# Patient Record
Sex: FEMALE | Race: WHITE | Employment: OTHER | URBAN - METROPOLITAN AREA
[De-identification: names, ages, dates, MRNs, and addresses within clinical notes are randomized per-mention and may not be internally consistent; named-entity substitution may affect disease eponyms.]

---

## 2022-09-26 ENCOUNTER — HOSPITAL ENCOUNTER (INPATIENT)
Age: 82
LOS: 1 days | Discharge: HOME | DRG: 066 | End: 2022-09-27
Attending: EMERGENCY MEDICINE | Admitting: HOSPITALIST
Payer: MEDICARE

## 2022-09-26 ENCOUNTER — APPOINTMENT (OUTPATIENT)
Dept: MRI IMAGING | Age: 82
DRG: 066 | End: 2022-09-26
Attending: EMERGENCY MEDICINE
Payer: MEDICARE

## 2022-09-26 ENCOUNTER — APPOINTMENT (OUTPATIENT)
Dept: CT IMAGING | Age: 82
DRG: 066 | End: 2022-09-26
Attending: EMERGENCY MEDICINE
Payer: MEDICARE

## 2022-09-26 ENCOUNTER — APPOINTMENT (OUTPATIENT)
Dept: GENERAL RADIOLOGY | Age: 82
DRG: 066 | End: 2022-09-26
Attending: EMERGENCY MEDICINE
Payer: MEDICARE

## 2022-09-26 DIAGNOSIS — R11.2 NAUSEA AND VOMITING, UNSPECIFIED VOMITING TYPE: ICD-10-CM

## 2022-09-26 DIAGNOSIS — R42 VERTIGO: Primary | ICD-10-CM

## 2022-09-26 DIAGNOSIS — K52.9 COLITIS: ICD-10-CM

## 2022-09-26 LAB
ALBUMIN SERPL-MCNC: 4 G/DL (ref 3.5–5)
ALBUMIN/GLOB SERPL: 1.1 {RATIO} (ref 1.1–2.2)
ALP SERPL-CCNC: 102 U/L (ref 45–117)
ALT SERPL-CCNC: 24 U/L (ref 12–78)
AMORPH CRY URNS QL MICRO: ABNORMAL
ANION GAP SERPL CALC-SCNC: 9 MMOL/L (ref 5–15)
APPEARANCE UR: CLEAR
AST SERPL-CCNC: 37 U/L (ref 15–37)
BACTERIA URNS QL MICRO: NEGATIVE /HPF
BASOPHILS # BLD: 0 K/UL (ref 0–0.1)
BASOPHILS NFR BLD: 0 % (ref 0–1)
BILIRUB SERPL-MCNC: 1.2 MG/DL (ref 0.2–1)
BILIRUB UR QL: NEGATIVE
BUN SERPL-MCNC: 16 MG/DL (ref 6–20)
BUN/CREAT SERPL: 19 (ref 12–20)
CALCIUM SERPL-MCNC: 10 MG/DL (ref 8.5–10.1)
CHLORIDE SERPL-SCNC: 103 MMOL/L (ref 97–108)
CO2 SERPL-SCNC: 24 MMOL/L (ref 21–32)
COLOR UR: ABNORMAL
CREAT SERPL-MCNC: 0.83 MG/DL (ref 0.55–1.02)
DIFFERENTIAL METHOD BLD: ABNORMAL
EOSINOPHIL # BLD: 0 K/UL (ref 0–0.4)
EOSINOPHIL NFR BLD: 0 % (ref 0–7)
EPITH CASTS URNS QL MICRO: ABNORMAL /LPF
ERYTHROCYTE [DISTWIDTH] IN BLOOD BY AUTOMATED COUNT: 12 % (ref 11.5–14.5)
GLOBULIN SER CALC-MCNC: 3.8 G/DL (ref 2–4)
GLUCOSE BLD STRIP.AUTO-MCNC: 135 MG/DL (ref 65–117)
GLUCOSE SERPL-MCNC: 193 MG/DL (ref 65–100)
GLUCOSE UR STRIP.AUTO-MCNC: NEGATIVE MG/DL
HCT VFR BLD AUTO: 36.7 % (ref 35–47)
HGB BLD-MCNC: 12.5 G/DL (ref 11.5–16)
HGB UR QL STRIP: NEGATIVE
IMM GRANULOCYTES # BLD AUTO: 0 K/UL (ref 0–0.04)
IMM GRANULOCYTES NFR BLD AUTO: 0 % (ref 0–0.5)
KETONES UR QL STRIP.AUTO: NEGATIVE MG/DL
LACTATE SERPL-SCNC: 1.2 MMOL/L (ref 0.4–2)
LACTATE SERPL-SCNC: 2.4 MMOL/L (ref 0.4–2)
LEUKOCYTE ESTERASE UR QL STRIP.AUTO: NEGATIVE
LIPASE SERPL-CCNC: 210 U/L (ref 73–393)
LYMPHOCYTES # BLD: 0.9 K/UL (ref 0.8–3.5)
LYMPHOCYTES NFR BLD: 13 % (ref 12–49)
MAGNESIUM SERPL-MCNC: 2.1 MG/DL (ref 1.6–2.4)
MCH RBC QN AUTO: 31.7 PG (ref 26–34)
MCHC RBC AUTO-ENTMCNC: 34.1 G/DL (ref 30–36.5)
MCV RBC AUTO: 93.1 FL (ref 80–99)
MONOCYTES # BLD: 0.3 K/UL (ref 0–1)
MONOCYTES NFR BLD: 4 % (ref 5–13)
NEUTS SEG # BLD: 6 K/UL (ref 1.8–8)
NEUTS SEG NFR BLD: 83 % (ref 32–75)
NITRITE UR QL STRIP.AUTO: NEGATIVE
NRBC # BLD: 0 K/UL (ref 0–0.01)
NRBC BLD-RTO: 0 PER 100 WBC
PH UR STRIP: 7.5 [PH] (ref 5–8)
PLATELET # BLD AUTO: 246 K/UL (ref 150–400)
PMV BLD AUTO: 9.7 FL (ref 8.9–12.9)
POTASSIUM SERPL-SCNC: 3.9 MMOL/L (ref 3.5–5.1)
PROT SERPL-MCNC: 7.8 G/DL (ref 6.4–8.2)
PROT UR STRIP-MCNC: NEGATIVE MG/DL
RBC # BLD AUTO: 3.94 M/UL (ref 3.8–5.2)
RBC #/AREA URNS HPF: ABNORMAL /HPF (ref 0–5)
SERVICE CMNT-IMP: ABNORMAL
SODIUM SERPL-SCNC: 136 MMOL/L (ref 136–145)
SP GR UR REFRACTOMETRY: 1.01 (ref 1–1.03)
TROPONIN-HIGH SENSITIVITY: 5 NG/L (ref 0–51)
UA: UC IF INDICATED,UAUC: ABNORMAL
UROBILINOGEN UR QL STRIP.AUTO: 0.2 EU/DL (ref 0.2–1)
WBC # BLD AUTO: 7.2 K/UL (ref 3.6–11)
WBC URNS QL MICRO: ABNORMAL /HPF (ref 0–4)

## 2022-09-26 PROCEDURE — 83690 ASSAY OF LIPASE: CPT

## 2022-09-26 PROCEDURE — 70553 MRI BRAIN STEM W/O & W/DYE: CPT

## 2022-09-26 PROCEDURE — 84484 ASSAY OF TROPONIN QUANT: CPT

## 2022-09-26 PROCEDURE — 70496 CT ANGIOGRAPHY HEAD: CPT

## 2022-09-26 PROCEDURE — 87040 BLOOD CULTURE FOR BACTERIA: CPT

## 2022-09-26 PROCEDURE — A9576 INJ PROHANCE MULTIPACK: HCPCS | Performed by: HOSPITALIST

## 2022-09-26 PROCEDURE — 74011000636 HC RX REV CODE- 636: Performed by: EMERGENCY MEDICINE

## 2022-09-26 PROCEDURE — 74011250637 HC RX REV CODE- 250/637: Performed by: EMERGENCY MEDICINE

## 2022-09-26 PROCEDURE — 74011000250 HC RX REV CODE- 250: Performed by: EMERGENCY MEDICINE

## 2022-09-26 PROCEDURE — 85025 COMPLETE CBC W/AUTO DIFF WBC: CPT

## 2022-09-26 PROCEDURE — 83735 ASSAY OF MAGNESIUM: CPT

## 2022-09-26 PROCEDURE — 99285 EMERGENCY DEPT VISIT HI MDM: CPT

## 2022-09-26 PROCEDURE — 80053 COMPREHEN METABOLIC PANEL: CPT

## 2022-09-26 PROCEDURE — 36415 COLL VENOUS BLD VENIPUNCTURE: CPT

## 2022-09-26 PROCEDURE — 83605 ASSAY OF LACTIC ACID: CPT

## 2022-09-26 PROCEDURE — 81001 URINALYSIS AUTO W/SCOPE: CPT

## 2022-09-26 PROCEDURE — 74011000258 HC RX REV CODE- 258: Performed by: HOSPITALIST

## 2022-09-26 PROCEDURE — 74177 CT ABD & PELVIS W/CONTRAST: CPT

## 2022-09-26 PROCEDURE — 96374 THER/PROPH/DIAG INJ IV PUSH: CPT

## 2022-09-26 PROCEDURE — 70450 CT HEAD/BRAIN W/O DYE: CPT

## 2022-09-26 PROCEDURE — 71045 X-RAY EXAM CHEST 1 VIEW: CPT

## 2022-09-26 PROCEDURE — 65270000046 HC RM TELEMETRY

## 2022-09-26 PROCEDURE — 74011250636 HC RX REV CODE- 250/636: Performed by: EMERGENCY MEDICINE

## 2022-09-26 PROCEDURE — 74011250636 HC RX REV CODE- 250/636: Performed by: HOSPITALIST

## 2022-09-26 PROCEDURE — 93005 ELECTROCARDIOGRAM TRACING: CPT

## 2022-09-26 PROCEDURE — 74011250637 HC RX REV CODE- 250/637: Performed by: HOSPITALIST

## 2022-09-26 PROCEDURE — 82962 GLUCOSE BLOOD TEST: CPT

## 2022-09-26 PROCEDURE — 96375 TX/PRO/DX INJ NEW DRUG ADDON: CPT

## 2022-09-26 RX ORDER — ACETAMINOPHEN 325 MG/1
650 TABLET ORAL
Status: DISCONTINUED | OUTPATIENT
Start: 2022-09-26 | End: 2022-09-27 | Stop reason: HOSPADM

## 2022-09-26 RX ORDER — ASPIRIN 325 MG
325 TABLET ORAL ONCE
Status: COMPLETED | OUTPATIENT
Start: 2022-09-26 | End: 2022-09-26

## 2022-09-26 RX ORDER — ADHESIVE BANDAGE
30 BANDAGE TOPICAL DAILY PRN
Status: DISCONTINUED | OUTPATIENT
Start: 2022-09-26 | End: 2022-09-27 | Stop reason: HOSPADM

## 2022-09-26 RX ORDER — SODIUM CHLORIDE 0.9 % (FLUSH) 0.9 %
5-40 SYRINGE (ML) INJECTION EVERY 8 HOURS
Status: DISCONTINUED | OUTPATIENT
Start: 2022-09-26 | End: 2022-09-27 | Stop reason: HOSPADM

## 2022-09-26 RX ORDER — ATORVASTATIN CALCIUM 40 MG/1
40 TABLET, FILM COATED ORAL
Status: DISCONTINUED | OUTPATIENT
Start: 2022-09-26 | End: 2022-09-27 | Stop reason: HOSPADM

## 2022-09-26 RX ORDER — DROPERIDOL 2.5 MG/ML
0.62 INJECTION, SOLUTION INTRAMUSCULAR; INTRAVENOUS ONCE
Status: COMPLETED | OUTPATIENT
Start: 2022-09-26 | End: 2022-09-26

## 2022-09-26 RX ORDER — FAMOTIDINE 20 MG/1
20 TABLET, FILM COATED ORAL 2 TIMES DAILY
Status: DISCONTINUED | OUTPATIENT
Start: 2022-09-26 | End: 2022-09-27 | Stop reason: HOSPADM

## 2022-09-26 RX ORDER — ENOXAPARIN SODIUM 100 MG/ML
30 INJECTION SUBCUTANEOUS EVERY 24 HOURS
Status: DISCONTINUED | OUTPATIENT
Start: 2022-09-26 | End: 2022-09-27 | Stop reason: HOSPADM

## 2022-09-26 RX ORDER — ONDANSETRON 2 MG/ML
4 INJECTION INTRAMUSCULAR; INTRAVENOUS
Status: DISCONTINUED | OUTPATIENT
Start: 2022-09-26 | End: 2022-09-27 | Stop reason: HOSPADM

## 2022-09-26 RX ORDER — GUAIFENESIN 100 MG/5ML
81 LIQUID (ML) ORAL DAILY
Status: DISCONTINUED | OUTPATIENT
Start: 2022-09-27 | End: 2022-09-27 | Stop reason: HOSPADM

## 2022-09-26 RX ORDER — ONDANSETRON 2 MG/ML
4 INJECTION INTRAMUSCULAR; INTRAVENOUS
Status: COMPLETED | OUTPATIENT
Start: 2022-09-26 | End: 2022-09-26

## 2022-09-26 RX ORDER — MECLIZINE HCL 12.5 MG 12.5 MG/1
25 TABLET ORAL
Status: COMPLETED | OUTPATIENT
Start: 2022-09-26 | End: 2022-09-26

## 2022-09-26 RX ORDER — METOCLOPRAMIDE HYDROCHLORIDE 5 MG/ML
10 INJECTION INTRAMUSCULAR; INTRAVENOUS
Status: COMPLETED | OUTPATIENT
Start: 2022-09-26 | End: 2022-09-26

## 2022-09-26 RX ORDER — SODIUM CHLORIDE 0.9 % (FLUSH) 0.9 %
5-40 SYRINGE (ML) INJECTION AS NEEDED
Status: DISCONTINUED | OUTPATIENT
Start: 2022-09-26 | End: 2022-09-27 | Stop reason: HOSPADM

## 2022-09-26 RX ORDER — SODIUM CHLORIDE 9 MG/ML
100 INJECTION, SOLUTION INTRAVENOUS CONTINUOUS
Status: DISCONTINUED | OUTPATIENT
Start: 2022-09-26 | End: 2022-09-26 | Stop reason: SDUPTHER

## 2022-09-26 RX ORDER — ACETAMINOPHEN 650 MG/1
650 SUPPOSITORY RECTAL
Status: DISCONTINUED | OUTPATIENT
Start: 2022-09-26 | End: 2022-09-27 | Stop reason: HOSPADM

## 2022-09-26 RX ORDER — SODIUM CHLORIDE 9 MG/ML
75 INJECTION, SOLUTION INTRAVENOUS CONTINUOUS
Status: DISPENSED | OUTPATIENT
Start: 2022-09-26 | End: 2022-09-27

## 2022-09-26 RX ADMIN — IOPAMIDOL 100 ML: 755 INJECTION, SOLUTION INTRAVENOUS at 12:54

## 2022-09-26 RX ADMIN — ATORVASTATIN CALCIUM 40 MG: 40 TABLET, FILM COATED ORAL at 22:16

## 2022-09-26 RX ADMIN — SODIUM CHLORIDE, PRESERVATIVE FREE 10 ML: 5 INJECTION INTRAVENOUS at 18:09

## 2022-09-26 RX ADMIN — AMPICILLIN SODIUM AND SULBACTAM SODIUM 3 G: 2; 1 INJECTION, POWDER, FOR SOLUTION INTRAMUSCULAR; INTRAVENOUS at 23:15

## 2022-09-26 RX ADMIN — AMPICILLIN SODIUM AND SULBACTAM SODIUM 3 G: 2; 1 INJECTION, POWDER, FOR SOLUTION INTRAMUSCULAR; INTRAVENOUS at 18:38

## 2022-09-26 RX ADMIN — ONDANSETRON 4 MG: 2 INJECTION INTRAMUSCULAR; INTRAVENOUS at 11:18

## 2022-09-26 RX ADMIN — ONDANSETRON 4 MG: 2 INJECTION INTRAMUSCULAR; INTRAVENOUS at 21:31

## 2022-09-26 RX ADMIN — SODIUM CHLORIDE 1000 ML: 9 INJECTION, SOLUTION INTRAVENOUS at 12:20

## 2022-09-26 RX ADMIN — METOCLOPRAMIDE 10 MG: 5 INJECTION, SOLUTION INTRAMUSCULAR; INTRAVENOUS at 12:19

## 2022-09-26 RX ADMIN — DROPERIDOL 0.62 MG: 2.5 INJECTION, SOLUTION INTRAMUSCULAR; INTRAVENOUS at 16:09

## 2022-09-26 RX ADMIN — GADOTERIDOL 10 ML: 279.3 INJECTION, SOLUTION INTRAVENOUS at 21:58

## 2022-09-26 RX ADMIN — ASPIRIN 325 MG ORAL TABLET 325 MG: 325 PILL ORAL at 16:08

## 2022-09-26 RX ADMIN — SODIUM CHLORIDE 75 ML/HR: 9 INJECTION, SOLUTION INTRAVENOUS at 18:03

## 2022-09-26 RX ADMIN — MECLIZINE 25 MG: 12.5 TABLET ORAL at 16:08

## 2022-09-26 RX ADMIN — ENOXAPARIN SODIUM 30 MG: 100 INJECTION SUBCUTANEOUS at 18:00

## 2022-09-26 RX ADMIN — SODIUM CHLORIDE, PRESERVATIVE FREE 10 ML: 5 INJECTION INTRAVENOUS at 21:31

## 2022-09-26 RX ADMIN — FAMOTIDINE 20 MG: 20 TABLET, FILM COATED ORAL at 18:01

## 2022-09-26 NOTE — H&P
Admission History and Physical      NAME:  Tori Lee   :   1940   MRN:  [id number]     PCP:  None     Date/Time:  2022           Subjective:     CHIEF COMPLAINT: Dizziness    HISTORY OF PRESENT ILLNESS:     Ms. Ciaran Aguillon is a 78 y.o. female with no medical major past medical problems, has not seen her PCP in 10 years. Actively healthy living. Woke up this morning having some dizzy spells vomiting and ataxia. She was brought in as code stroke to ED, CT head and CT angio head and neck was done which showed no acute stroke but chronic changes. It was felt by ER physician and MD that she would need to be further work-up with MRI brain and neurology evaluation. Patient was given meclizine with improvement in her symptoms. Patient otherwise denies any acute complaints to me no nausea, no abdominal pain, no diarrhea. Overall she is not excited about staying in the hospital and has been asking if she could be discharged home soon. On insistence she agreed to stay till work-up done for another 24-hour. No past medical history on file. Denies previous major medical problems    No past surgical history on file. Social History     Tobacco Use    Smoking status: Not on file    Smokeless tobacco: Not on file   Substance Use Topics    Alcohol use: Not on file        No family history on file.   Family history of stroke    No Known Allergies     Prior to Admission medications    Not on File     Does not take any home medications    Review of Systems:  Pertinent findings per HPI otherwise remaining 10 review of system negative         Objective:      VITALS:    Vital signs reviewed; most recent are:    Visit Vitals  BP (!) 168/72   Pulse (!) 59   Temp (!) 94.2 °F (34.6 °C)   Resp 22   Ht 5' 4\" (1.626 m)   Wt 49.9 kg (110 lb)   SpO2 99%   BMI 18.88 kg/m²     SpO2 Readings from Last 6 Encounters:   22 99%        No intake or output data in the 24 hours ending 22 0605         Exam: Physical Exam:    Gen: Thin built in no acute distress  HEENT:  hearing intact to voice, moist mucous membranes  Neck:  Supple, without masses, thyroid non-tender  Resp:  No accessory muscle use, clear breath sounds without wheezes rales or rhonchi  Card:  No murmurs, normal S1, S2 without thrills, bruits or peripheral edema  Abd:  Soft, non-tender, non-distended, nontender  Lymph:  No visible cervical adenopathy  Musc:  No cyanosis or clubbing  Skin:  No rashes   Neuro:    follows commands appropriately, no focal deficits, gait not tested  Psych:   Anxious       Labs:    Recent Labs     09/26/22  1120   WBC 7.2   HGB 12.5   HCT 36.7        Recent Labs     09/26/22  1120      K 3.9      CO2 24   *   BUN 16   CREA 0.83   CA 10.0   MG 2.1   ALB 4.0   TBILI 1.2*   ALT 24     No results found for: GLUCPOC  No results for input(s): PH, PCO2, PO2, HCO3, FIO2 in the last 72 hours. No results for input(s): INR, INREXT in the last 72 hours. CTA head/neck- IMPRESSION     CTA Head:  1. No evidence of significant stenosis or aneurysm. 2. Redemonstrated irregular serpiginous calcifications in the left anterior  inferior cerebellum, which are chronic, and can be evaluated with outpatient MRI  brain if clinically warranted. CTA Neck:  1. No evidence of flow-limiting stenosis. 2. Mild stenosis (less than 50% by NASCET criteria) of the proximal left  internal carotid artery. 3. Beaded appearance of the bilateral cervical internal carotid arteries,  suggestive of fibromuscular dysplasia. Assesment and Plan:     Suspected TIA versus stroke  Had some dizzy spells/gait instability and some vomiting.   CT head no acute process but CTA head and neck as above showed some chronic changes  Stroke order set initiated  MRI brain ordered  Get echocardiogram  Bedside swallowing if passes will put her on diet and meds  PT OT and SLP evaluation  Aspirin and statins for now  Check LDL    Elevated BP W/O HTN-allow permissive HTN until stroke ruled out  Hyperglycemia-check A1c, monitor BG  Nonspecific colitis-noted on CT, mild elevated LA, empiric IV Unasyn, GI consult    AD-she does not have one  DVT PRx SQ Lovenox  NOK-son  Dispo-Home if work-up complete 24 to 48 hours             ___________________________________________________    Attending Physician: Lucero Corona MD   9/26/2022

## 2022-09-26 NOTE — PROGRESS NOTES
Physical Therapy    Chart reviewed and orders acknowledge. Attempted to see pt for PT eval this PM but pt currently ABAD and unavailable. Will follow up tomorrow.     Marco Romero PT, DPT, NCS

## 2022-09-26 NOTE — ED PROVIDER NOTES
EMERGENCY DEPARTMENT HISTORY AND PHYSICAL EXAM      Date: 9/26/2022  Patient Name: [facility]    History of Presenting Illness     CC: n/v      History Provided By: Patient and Daughter-in-law    HPI: [facility], 78 y.o. female presents to the ED with cc of n/v. Pt not able to provide any significant hx except to say she is very nauseated and dizzy. Per family pt had sudden onset this morning of nausea and vomiting. Pt had eaten quiche for dinner last night and had eaten a slice this am after it had set out on counter all night. Almost immediately following this she began vomiting. Pt symptoms severe in nature as she presents with continued dry heaves. Pt states she is also extremely dizzy but unable to elaborate woozy or room spinning or onset of dizziness. She denies any recent illness. She denies headache, CP, SOA, palpitations. She states she is very thirst. She states mild diffuse abdominal soreness. No  hematemesis. No diarrhea. There are no other complaints, changes, or physical findings at this time. PCP: None    No current facility-administered medications on file prior to encounter. No current outpatient medications on file prior to encounter. Past History     Past Medical History:  None    Past Surgical History:  None    Family History:  None    Social History:   No tobacco, alcohol, drugs,     Daughter-in-law states it has been numerous yrs since the pt has seen a doctor but lives at home alone, takes daily vitamin and typically walks 7 miles a day. Allergies:  No Known Allergies      Review of Systems   Review of Systems   Unable to perform ROS: Acuity of condition     Physical Exam   Physical Exam  Vitals and nursing note reviewed. Constitutional:       General: She is in acute distress. Appearance: She is well-developed. She is ill-appearing. She is not diaphoretic. Comments:  Thin elderly female, actively dry heaving   HENT:      Head: Normocephalic and atraumatic. Mouth/Throat:      Mouth: Mucous membranes are dry. Pharynx: No oropharyngeal exudate. Eyes:      Extraocular Movements: Extraocular movements intact. Conjunctiva/sclera: Conjunctivae normal.      Pupils: Pupils are equal, round, and reactive to light. Neck:      Vascular: No JVD. Trachea: No tracheal deviation. Cardiovascular:      Rate and Rhythm: Normal rate and regular rhythm. Heart sounds: Normal heart sounds. No murmur heard. Pulmonary:      Effort: Pulmonary effort is normal. No respiratory distress. Breath sounds: Normal breath sounds. No stridor. No wheezing or rales. Abdominal:      General: There is no distension. Palpations: Abdomen is soft. Tenderness: There is abdominal tenderness (mild diffuse). There is no guarding or rebound. Musculoskeletal:         General: Normal range of motion. Cervical back: Normal range of motion and neck supple. Right lower leg: No edema. Left lower leg: No edema. Skin:     General: Skin is warm and dry. Capillary Refill: Capillary refill takes less than 2 seconds. Neurological:      Mental Status: She is alert and oriented to person, place, and time. Cranial Nerves: No cranial nerve deficit.       Comments: No focal motor or sensory deficits, no facial asymmetry, no speech issues, no nystagmus    Psychiatric:         Mood and Affect: Mood normal.         Behavior: Behavior normal.       Diagnostic Study Results     Labs -     Recent Results (from the past 12 hour(s))   CBC WITH AUTOMATED DIFF    Collection Time: 09/26/22 11:20 AM   Result Value Ref Range    WBC 7.2 3.6 - 11.0 K/uL    RBC 3.94 3.80 - 5.20 M/uL    HGB 12.5 11.5 - 16.0 g/dL    HCT 36.7 35.0 - 47.0 %    MCV 93.1 80.0 - 99.0 FL    MCH 31.7 26.0 - 34.0 PG    MCHC 34.1 30.0 - 36.5 g/dL    RDW 12.0 11.5 - 14.5 %    PLATELET 377 837 - 334 K/uL    MPV 9.7 8.9 - 12.9 FL    NRBC 0.0 0  WBC    ABSOLUTE NRBC 0.00 0.00 - 0.01 K/uL    NEUTROPHILS 83 (H) 32 - 75 %    LYMPHOCYTES 13 12 - 49 %    MONOCYTES 4 (L) 5 - 13 %    EOSINOPHILS 0 0 - 7 %    BASOPHILS 0 0 - 1 %    IMMATURE GRANULOCYTES 0 0.0 - 0.5 %    ABS. NEUTROPHILS 6.0 1.8 - 8.0 K/UL    ABS. LYMPHOCYTES 0.9 0.8 - 3.5 K/UL    ABS. MONOCYTES 0.3 0.0 - 1.0 K/UL    ABS. EOSINOPHILS 0.0 0.0 - 0.4 K/UL    ABS. BASOPHILS 0.0 0.0 - 0.1 K/UL    ABS. IMM. GRANS. 0.0 0.00 - 0.04 K/UL    DF AUTOMATED     LIPASE    Collection Time: 09/26/22 11:20 AM   Result Value Ref Range    Lipase 210 73 - 393 U/L   LACTIC ACID    Collection Time: 09/26/22 11:20 AM   Result Value Ref Range    Lactic acid 2.4 (HH) 0.4 - 2.0 MMOL/L   METABOLIC PANEL, COMPREHENSIVE    Collection Time: 09/26/22 11:20 AM   Result Value Ref Range    Sodium 136 136 - 145 mmol/L    Potassium 3.9 3.5 - 5.1 mmol/L    Chloride 103 97 - 108 mmol/L    CO2 24 21 - 32 mmol/L    Anion gap 9 5 - 15 mmol/L    Glucose 193 (H) 65 - 100 mg/dL    BUN 16 6 - 20 MG/DL    Creatinine 0.83 0.55 - 1.02 MG/DL    BUN/Creatinine ratio 19 12 - 20      GFR est AA >60 >60 ml/min/1.73m2    GFR est non-AA >60 >60 ml/min/1.73m2    Calcium 10.0 8.5 - 10.1 MG/DL    Bilirubin, total 1.2 (H) 0.2 - 1.0 MG/DL    ALT (SGPT) 24 12 - 78 U/L    AST (SGOT) 37 15 - 37 U/L    Alk.  phosphatase 102 45 - 117 U/L    Protein, total 7.8 6.4 - 8.2 g/dL    Albumin 4.0 3.5 - 5.0 g/dL    Globulin 3.8 2.0 - 4.0 g/dL    A-G Ratio 1.1 1.1 - 2.2     TROPONIN-HIGH SENSITIVITY    Collection Time: 09/26/22 11:20 AM   Result Value Ref Range    Troponin-High Sensitivity 5 0 - 51 ng/L   URINALYSIS W/ REFLEX CULTURE    Collection Time: 09/26/22 11:20 AM    Specimen: Urine   Result Value Ref Range    Color YELLOW/STRAW      Appearance CLEAR CLEAR      Specific gravity 1.015 1.003 - 1.030      pH (UA) 7.5 5.0 - 8.0      Protein Negative NEG mg/dL    Glucose Negative NEG mg/dL    Ketone Negative NEG mg/dL    Bilirubin Negative NEG      Blood Negative NEG      Urobilinogen 0.2 0.2 - 1.0 EU/dL Nitrites Negative NEG      Leukocyte Esterase Negative NEG      WBC 0-4 0 - 4 /hpf    RBC 0-5 0 - 5 /hpf    Epithelial cells FEW FEW /lpf    Bacteria Negative NEG /hpf    UA:UC IF INDICATED CULTURE NOT INDICATED BY UA RESULT CNI      Amorphous Crystals FEW (A) NEG     MAGNESIUM    Collection Time: 09/26/22 11:20 AM   Result Value Ref Range    Magnesium 2.1 1.6 - 2.4 mg/dL   EKG, 12 LEAD, INITIAL    Collection Time: 09/26/22 11:45 AM   Result Value Ref Range    Ventricular Rate 53 BPM    Atrial Rate 53 BPM    P-R Interval 160 ms    QRS Duration 84 ms    Q-T Interval 490 ms    QTC Calculation (Bezet) 459 ms    Calculated P Axis 73 degrees    Calculated R Axis 59 degrees    Calculated T Axis 44 degrees    Diagnosis       Sinus bradycardia  Minimal voltage criteria for LVH, may be normal variant  Septal infarct , age undetermined  No previous ECGs available         Radiologic Studies -   XR CHEST PORT   Final Result   No acute findings. CT HEAD WO CONT   Final Result   1. No evidence of acute intracranial abnormality. 2. Moderate to severe chronic microvascular ischemic disease. 3. Irregular serpiginous calcifications in the left anterior inferior   cerebellum, of uncertain etiology. These can be further evaluated with MRI brain   without and with contrast.         CTA HEAD NECK W CONT   Final Result      CTA Head:   1. No evidence of significant stenosis or aneurysm. 2. Redemonstrated irregular serpiginous calcifications in the left anterior   inferior cerebellum, which are chronic, and can be evaluated with outpatient MRI   brain if clinically warranted. CTA Neck:   1. No evidence of flow-limiting stenosis. 2. Mild stenosis (less than 50% by NASCET criteria) of the proximal left   internal carotid artery. 3. Beaded appearance of the bilateral cervical internal carotid arteries,   suggestive of fibromuscular dysplasia. CT ABD PELV W CONT   Final Result   1.   Question low-grade colitis involving the ascending and transverse colon. 2.  Large amount stool in the rectum. 3.  Distended urinary bladder. 4.  Other incidental findings as above      MRI BRAIN W WO CONT    (Results Pending)     CT Results  (Last 48 hours)                 09/26/22 1248  CT HEAD WO CONT Final result    Impression:  1. No evidence of acute intracranial abnormality. 2. Moderate to severe chronic microvascular ischemic disease. 3. Irregular serpiginous calcifications in the left anterior inferior   cerebellum, of uncertain etiology. These can be further evaluated with MRI brain   without and with contrast.           Narrative:  EXAM:  CT HEAD WO CONT       INDICATION:   Dizziness       COMPARISON: None. TECHNIQUE: Unenhanced CT of the head was performed using 5 mm images. Brain and   bone windows were generated. CT dose reduction was achieved through use of a   standardized protocol tailored for this examination and automatic exposure   control for dose modulation. FINDINGS:   The ventricles are normal in size and position. Scattered confluent areas of   periventricular and deep white matter hypodensities, consistent with moderate to   severe chronic microangiopathic ischemic changes. There are irregular   serpiginous calcifications in the left anterior cerebellum. Basilar cisterns are   patent. No midline shift. There is no evidence of acute infarct, hemorrhage, or   extraaxial fluid collection. The paranasal sinuses, mastoid air cells, and middle ears are clear. The orbital   contents are within normal limits. There are no significant osseous or   extracranial soft tissue lesions. 09/26/22 1248  CTA HEAD NECK W CONT Final result    Impression:      CTA Head:   1. No evidence of significant stenosis or aneurysm.    2. Redemonstrated irregular serpiginous calcifications in the left anterior   inferior cerebellum, which are chronic, and can be evaluated with outpatient MRI   brain if clinically warranted. CTA Neck:   1. No evidence of flow-limiting stenosis. 2. Mild stenosis (less than 50% by NASCET criteria) of the proximal left   internal carotid artery. 3. Beaded appearance of the bilateral cervical internal carotid arteries,   suggestive of fibromuscular dysplasia. Narrative:  EXAM:  CTA HEAD NECK W CONT       INDICATION:   severe dizziness       COMPARISON:  CT head 9/26/2022. CONTRAST:  100 mL of Isovue-370. TECHNIQUE:  Unenhanced  images were obtained to localize the volume for   acquisition. Multislice helical axial CT angiography was performed from the   aortic arch to the top of the head during uneventful rapid bolus intravenous   contrast administration. Coronal and sagittal reformations and 3D post   processing was performed. CT dose reduction was achieved through use of a   standardized protocol tailored for this examination and automatic exposure   control for dose modulation. FINDINGS:       CTA Head:   There is no evidence of large vessel occlusion or flow-limiting stenosis of the   intracranial internal carotid, anterior cerebral, and middle cerebral arteries. Calcification of the bilateral carotid siphons without significant stenosis. The   anterior communicating artery is patent. There is no evidence of large vessel occlusion or flow-limiting stenosis of the   intracranial vertebral arteries, basilar artery, or posterior cerebral arteries. The posterior communicating arteries are not well seen. There is no evidence of aneurysm or vascular malformation. The dural venous   sinuses and deep cerebral venous system are patent. No evidence of abnormal   enhancement on delayed phase images. Redemonstrated irregular serpiginous   calcifications in the left anterior inferior cerebellum. CTA NECK:   NASCET method was utilized for calculating stenosis. The aortic arch is unremarkable.  The common carotid arteries demonstrate no   significant stenosis. There is no evidence of significant stenosis in the   cervical right internal carotid artery. Calcific atherosclerosis of the proximal   left internal carotid artery with mild (less than 50%) stenosis. Beaded   appearance of the bilateral cervical internal carotid arteries. There is a left dominant vertebrobasilar arterial system. The cervical vertebral   arteries are normal in course, size and contour without significant stenosis. Visualized soft tissues of the neck are unremarkable. Mild biapical pleural   parenchymal scarring. No acute fracture or aggressive osseous lesion. Severe   degenerative disc disease throughout the cervical spine with scattered facet   arthropathy. 09/26/22 1248  CT ABD PELV W CONT Final result    Impression:  1. Question low-grade colitis involving the ascending and transverse colon. 2.  Large amount stool in the rectum. 3.  Distended urinary bladder. 4.  Other incidental findings as above       Narrative:  EXAM: CT ABD PELV W CONT       INDICATION: Severe nausea and vomiting       COMPARISON: No comparison        CONTRAST: 100 mL of Isovue-370. TECHNIQUE:    Following the uneventful intravenous administration of contrast, thin axial   images were obtained through the abdomen and pelvis. Coronal and sagittal   reconstructions were generated. Oral contrast was not administered. CT dose   reduction was achieved through use of a standardized protocol tailored for this   examination and automatic exposure control for dose modulation. FINDINGS:    LOWER THORAX: No significant abnormality in the incidentally imaged lower chest.   LIVER: No mass. BILIARY TREE: Gallbladder is within normal limits. CBD is not dilated. SPLEEN: within normal limits. PANCREAS: No mass or ductal dilatation. ADRENALS: Unremarkable. KIDNEYS: No mass, calculus, or hydronephrosis.  Small hypodensity left kidney too   small to characterize. STOMACH: Unremarkable. SMALL BOWEL: No dilatation or wall thickening. COLON: Questionable mild wall thickening without dilatation involving the   ascending and transverse colon. . Large amount stool in the rectum. APPENDIX: The appendix is not well visualized. There is possible small   hypodensities versus appendiceal stump with an appendicolith versus a clip. PERITONEUM: No ascites or pneumoperitoneum. RETROPERITONEUM: No lymphadenopathy or aortic aneurysm. REPRODUCTIVE ORGANS: Unremarkable   URINARY BLADDER: Distended urinary bladder. BONES: No destructive bone lesion. 3 mm anterolisthesis of L3 and L4. Degenerative changes with canal stenosis foraminal narrowing the lower lumbar   spine. ABDOMINAL WALL: No mass or hernia. ADDITIONAL COMMENTS: N/A                 CXR Results  (Last 48 hours)                 09/26/22 1503  XR CHEST PORT Final result    Impression:  No acute findings. Narrative:  EXAM: XR CHEST PORT       DATE: 9/26/2022 3:03 PM       INDICATION: Admission       COMPARISON: None. TECHNIQUE: A portable AP radiograph of the chest was obtained. FINDINGS:    Top normal heart size. Mediastinal contours are otherwise unremarkable. No   pulmonary edema. No focal consolidations, pleural effusions, or pneumothorax. Lungs are hyperexpanded bilaterally. No acute osseous findings. Medical Decision Making   I am the first provider for this patient. I reviewed the vital signs, available nursing notes, past medical history, past surgical history, family history and social history. Vital Signs-Reviewed the patient's vital signs.   Patient Vitals for the past 12 hrs:   Temp Pulse Resp BP SpO2   09/26/22 1156 -- (!) 59 22 (!) 168/72 99 %   09/26/22 1141 -- 62 17 (!) 161/64 97 %   09/26/22 1126 (!) 94.2 °F (34.6 °C) (!) 54 22 (!) 165/55 100 %       EKG interpretation: (Preliminary)  Sinus solo, rate 53, normal axis/pr/qrs. Records Reviewed: Nursing Notes    Provider Notes (Medical Decision Making):   DDX- Food poisoning, dehydration, electrolyte abnormality, vertigo, ICH, CVA, colitis, pancreatitis, UTI    ED Course:   Initial assessment performed. The patients presenting problems have been discussed, and they are in agreement with the care plan formulated and outlined with them. I have encouraged them to ask questions as they arise throughout their visit. Pt arrived in distress unable to give an account if vomiting started before dizziness or after. She had been given two  doses of nausea meds with continued dry heaves. With episodes of vomiting pt with vagal bradycardia as well as multi-focal PVC's. Pt also with continued dizziness. Will dose with Inapsine. CT head and CTA head and neck ordered, will also include CT Abd/Pelvis given no etiology for vomiting. Pt denies any abd pain, states she is sore from all of the retching    Pt with resolution of n/v following Inapsine. Pt had ambulated to bathroom with ataxic gait. CT head calcification Cerebellum, no mention of hemorrhage. Recommend MRI w/ and w/o which has been placed. Will dose with ASA 325mg,     CT Abd/pelvis shows transverse colitis, will send BCx, Zosyn ordered, pt had give 1L IV fluid bolus, will order additional fluids. Results discussed with pt and family and plan for admission. After vomiting was brought under control, Ventricular ectopy had ceased. Discussed with pt and family, will admit for further evaluation. Consult:  Case discussed with hospitalist who will evaluate and admit.      CRITICAL CARE NOTE :    5:29 PM    IMPENDING DETERIORATION -Cardiovascular, CNS, Metabolic, and Renal  ASSOCIATED RISK FACTORS - Dysrhythmia, Metabolic changes, Dehydration, and CNS Decompensation  MANAGEMENT- Bedside Assessment and Supervision of Care  INTERPRETATION -  Xrays, CT Scan, ECG, Blood Pressure, Cardiac Output Measures , and labs  INTERVENTIONS - hemodynamic mngmt, Neurologic interventions , and antiemtics  CASE REVIEW - Hospitalist/Intensivist, Nursing, and Family  TREATMENT RESPONSE -Improved  PERFORMED BY - Self    NOTES   :  I have spent 40 minutes of critical care time involved in lab review, consultations with specialist, family decision- making, bedside attention and documentation. This time excludes time spent in any separate billed procedures. During this entire length of time I was immediately available to the patient . Marry Aviles DO         Disposition:  Admit     Diagnosis     Clinical Impression:   1. Vertigo    2. Nausea and vomiting, unspecified vomiting type    3. Colitis        Attestations:    Marry Aviles DO        Please note that this dictation was completed with [employer], the computer voice recognition software. Quite often unanticipated grammatical, syntax, homophones, and other interpretive errors are inadvertently transcribed by the computer software. Please disregard these errors. Please excuse any errors that have escaped final proofreading. Thank you.

## 2022-09-27 ENCOUNTER — APPOINTMENT (OUTPATIENT)
Dept: NON INVASIVE DIAGNOSTICS | Age: 82
DRG: 066 | End: 2022-09-27
Attending: HOSPITALIST
Payer: MEDICARE

## 2022-09-27 ENCOUNTER — APPOINTMENT (OUTPATIENT)
Dept: NON INVASIVE DIAGNOSTICS | Age: 82
DRG: 066 | End: 2022-09-27
Attending: INTERNAL MEDICINE
Payer: MEDICARE

## 2022-09-27 VITALS
HEIGHT: 64 IN | SYSTOLIC BLOOD PRESSURE: 156 MMHG | RESPIRATION RATE: 18 BRPM | OXYGEN SATURATION: 98 % | HEART RATE: 72 BPM | WEIGHT: 110 LBS | BODY MASS INDEX: 18.78 KG/M2 | TEMPERATURE: 98.1 F | DIASTOLIC BLOOD PRESSURE: 74 MMHG

## 2022-09-27 PROBLEM — R42 DIZZINESS: Status: RESOLVED | Noted: 2022-09-26 | Resolved: 2022-09-27

## 2022-09-27 LAB
CHOLEST SERPL-MCNC: 199 MG/DL
CRP SERPL-MCNC: <0.29 MG/DL (ref 0–0.6)
ECHO AO ROOT DIAM: 3.5 CM
ECHO AO ROOT INDEX: 2.3 CM/M2
ECHO AR MAX VEL PISA: 4.8 M/S
ECHO AV AREA PEAK VELOCITY: 2.4 CM2
ECHO AV AREA VTI: 2.6 CM2
ECHO AV AREA/BSA PEAK VELOCITY: 1.6 CM2/M2
ECHO AV AREA/BSA VTI: 1.7 CM2/M2
ECHO AV MEAN GRADIENT: 6 MMHG
ECHO AV MEAN VELOCITY: 1.1 M/S
ECHO AV PEAK GRADIENT: 12 MMHG
ECHO AV PEAK VELOCITY: 1.8 M/S
ECHO AV REGURGITANT PHT: 298 MILLISECOND
ECHO AV VELOCITY RATIO: 0.78
ECHO AV VTI: 34.1 CM
ECHO EST RA PRESSURE: 10 MMHG
ECHO LA DIAMETER INDEX: 1.71 CM/M2
ECHO LA DIAMETER: 2.6 CM
ECHO LA TO AORTIC ROOT RATIO: 0.74
ECHO LA VOL 4C: 43 ML (ref 22–52)
ECHO LA VOLUME INDEX A4C: 28 ML/M2 (ref 16–34)
ECHO LV E' LATERAL VELOCITY: 9 CM/S
ECHO LV E' SEPTAL VELOCITY: 7 CM/S
ECHO LV EDV A4C: 105 ML
ECHO LV EDV INDEX A4C: 69 ML/M2
ECHO LV EJECTION FRACTION A4C: 60 %
ECHO LV ESV A4C: 42 ML
ECHO LV ESV INDEX A4C: 28 ML/M2
ECHO LV FRACTIONAL SHORTENING: 50 % (ref 28–44)
ECHO LV INTERNAL DIMENSION DIASTOLE INDEX: 2.37 CM/M2
ECHO LV INTERNAL DIMENSION DIASTOLIC: 3.6 CM (ref 3.9–5.3)
ECHO LV INTERNAL DIMENSION SYSTOLIC INDEX: 1.18 CM/M2
ECHO LV INTERNAL DIMENSION SYSTOLIC: 1.8 CM
ECHO LV IVSD: 1 CM (ref 0.6–0.9)
ECHO LV MASS 2D: 124.1 G (ref 67–162)
ECHO LV MASS INDEX 2D: 81.7 G/M2 (ref 43–95)
ECHO LV POSTERIOR WALL DIASTOLIC: 1.2 CM (ref 0.6–0.9)
ECHO LV RELATIVE WALL THICKNESS RATIO: 0.67
ECHO LVOT AREA: 3.1 CM2
ECHO LVOT AV VTI INDEX: 0.84
ECHO LVOT DIAM: 2 CM
ECHO LVOT MEAN GRADIENT: 4 MMHG
ECHO LVOT PEAK GRADIENT: 7 MMHG
ECHO LVOT PEAK VELOCITY: 1.4 M/S
ECHO LVOT STROKE VOLUME INDEX: 59.5 ML/M2
ECHO LVOT SV: 90.4 ML
ECHO LVOT VTI: 28.8 CM
ECHO MV A VELOCITY: 0.91 M/S
ECHO MV AREA VTI: 3.2 CM2
ECHO MV E DECELERATION TIME (DT): 175.7 MS
ECHO MV E VELOCITY: 1.24 M/S
ECHO MV E/A RATIO: 1.36
ECHO MV E/E' LATERAL: 13.78
ECHO MV E/E' RATIO (AVERAGED): 15.75
ECHO MV E/E' SEPTAL: 17.71
ECHO MV LVOT VTI INDEX: 1
ECHO MV MAX VELOCITY: 1.5 M/S
ECHO MV MEAN GRADIENT: 4 MMHG
ECHO MV MEAN VELOCITY: 0.9 M/S
ECHO MV PEAK GRADIENT: 8 MMHG
ECHO MV VTI: 28.7 CM
ECHO PV MAX VELOCITY: 0.9 M/S
ECHO PV PEAK GRADIENT: 3 MMHG
ECHO RIGHT VENTRICULAR SYSTOLIC PRESSURE (RVSP): 45 MMHG
ECHO RV INTERNAL DIMENSION: 4.7 CM
ECHO RV TAPSE: 3.9 CM (ref 1.7–?)
ECHO TV REGURGITANT MAX VELOCITY: 2.95 M/S
ECHO TV REGURGITANT PEAK GRADIENT: 36 MMHG
EST. AVERAGE GLUCOSE BLD GHB EST-MCNC: 117 MG/DL
GLUCOSE BLD STRIP.AUTO-MCNC: 86 MG/DL (ref 65–117)
HBA1C MFR BLD: 5.7 % (ref 4–5.6)
HDLC SERPL-MCNC: 59 MG/DL
HDLC SERPL: 3.4 {RATIO} (ref 0–5)
LACTATE SERPL-SCNC: 0.6 MMOL/L (ref 0.4–2)
LDLC SERPL CALC-MCNC: 128.2 MG/DL (ref 0–100)
SERVICE CMNT-IMP: NORMAL
TRIGL SERPL-MCNC: 59 MG/DL (ref ?–150)
VLDLC SERPL CALC-MCNC: 11.8 MG/DL

## 2022-09-27 PROCEDURE — 97116 GAIT TRAINING THERAPY: CPT

## 2022-09-27 PROCEDURE — 36415 COLL VENOUS BLD VENIPUNCTURE: CPT

## 2022-09-27 PROCEDURE — 74011250636 HC RX REV CODE- 250/636: Performed by: HOSPITALIST

## 2022-09-27 PROCEDURE — 74011250637 HC RX REV CODE- 250/637: Performed by: HOSPITALIST

## 2022-09-27 PROCEDURE — 82962 GLUCOSE BLOOD TEST: CPT

## 2022-09-27 PROCEDURE — 97161 PT EVAL LOW COMPLEX 20 MIN: CPT

## 2022-09-27 PROCEDURE — 83036 HEMOGLOBIN GLYCOSYLATED A1C: CPT

## 2022-09-27 PROCEDURE — 74011000258 HC RX REV CODE- 258: Performed by: HOSPITALIST

## 2022-09-27 PROCEDURE — 86140 C-REACTIVE PROTEIN: CPT

## 2022-09-27 PROCEDURE — 94760 N-INVAS EAR/PLS OXIMETRY 1: CPT

## 2022-09-27 PROCEDURE — 93271 ECG/MONITORING AND ANALYSIS: CPT

## 2022-09-27 PROCEDURE — 80061 LIPID PANEL: CPT

## 2022-09-27 PROCEDURE — 97165 OT EVAL LOW COMPLEX 30 MIN: CPT

## 2022-09-27 PROCEDURE — 93306 TTE W/DOPPLER COMPLETE: CPT

## 2022-09-27 PROCEDURE — 74011250637 HC RX REV CODE- 250/637: Performed by: PSYCHIATRY & NEUROLOGY

## 2022-09-27 PROCEDURE — 74011000250 HC RX REV CODE- 250: Performed by: EMERGENCY MEDICINE

## 2022-09-27 PROCEDURE — 83605 ASSAY OF LACTIC ACID: CPT

## 2022-09-27 PROCEDURE — 97535 SELF CARE MNGMENT TRAINING: CPT

## 2022-09-27 PROCEDURE — 99223 1ST HOSP IP/OBS HIGH 75: CPT | Performed by: PSYCHIATRY & NEUROLOGY

## 2022-09-27 RX ORDER — CLOPIDOGREL BISULFATE 75 MG/1
75 TABLET ORAL DAILY
Status: DISCONTINUED | OUTPATIENT
Start: 2022-09-27 | End: 2022-09-27 | Stop reason: HOSPADM

## 2022-09-27 RX ORDER — GUAIFENESIN 100 MG/5ML
81 LIQUID (ML) ORAL DAILY
Qty: 30 TABLET | Refills: 0 | Status: SHIPPED | OUTPATIENT
Start: 2022-09-28

## 2022-09-27 RX ORDER — CLOPIDOGREL BISULFATE 75 MG/1
75 TABLET ORAL DAILY
Qty: 21 TABLET | Refills: 0 | Status: SHIPPED | OUTPATIENT
Start: 2022-09-28 | End: 2022-10-19

## 2022-09-27 RX ORDER — ATORVASTATIN CALCIUM 40 MG/1
40 TABLET, FILM COATED ORAL
Qty: 30 TABLET | Refills: 0 | Status: SHIPPED | OUTPATIENT
Start: 2022-09-27

## 2022-09-27 RX ORDER — FAMOTIDINE 20 MG/1
20 TABLET, FILM COATED ORAL 2 TIMES DAILY
Qty: 30 TABLET | Refills: 0 | Status: SHIPPED | OUTPATIENT
Start: 2022-09-27

## 2022-09-27 RX ADMIN — SODIUM CHLORIDE 75 ML/HR: 9 INJECTION, SOLUTION INTRAVENOUS at 11:33

## 2022-09-27 RX ADMIN — ENOXAPARIN SODIUM 30 MG: 100 INJECTION SUBCUTANEOUS at 14:40

## 2022-09-27 RX ADMIN — AMPICILLIN SODIUM AND SULBACTAM SODIUM 3 G: 2; 1 INJECTION, POWDER, FOR SOLUTION INTRAMUSCULAR; INTRAVENOUS at 17:05

## 2022-09-27 RX ADMIN — SODIUM CHLORIDE, PRESERVATIVE FREE 10 ML: 5 INJECTION INTRAVENOUS at 05:25

## 2022-09-27 RX ADMIN — FAMOTIDINE 20 MG: 20 TABLET, FILM COATED ORAL at 17:06

## 2022-09-27 RX ADMIN — CLOPIDOGREL BISULFATE 75 MG: 75 TABLET ORAL at 14:40

## 2022-09-27 RX ADMIN — AMPICILLIN SODIUM AND SULBACTAM SODIUM 3 G: 2; 1 INJECTION, POWDER, FOR SOLUTION INTRAMUSCULAR; INTRAVENOUS at 11:29

## 2022-09-27 RX ADMIN — AMPICILLIN SODIUM AND SULBACTAM SODIUM 3 G: 2; 1 INJECTION, POWDER, FOR SOLUTION INTRAMUSCULAR; INTRAVENOUS at 05:22

## 2022-09-27 RX ADMIN — ASPIRIN 81 MG: 81 TABLET, CHEWABLE ORAL at 09:11

## 2022-09-27 RX ADMIN — FAMOTIDINE 20 MG: 20 TABLET, FILM COATED ORAL at 09:14

## 2022-09-27 RX ADMIN — SODIUM CHLORIDE, PRESERVATIVE FREE 10 ML: 5 INJECTION INTRAVENOUS at 14:40

## 2022-09-27 NOTE — PROGRESS NOTES
SPEECH PATHOLOGY BEDSIDE SWALLOW EVALUATION/DISCHARGE  Patient: Eddie Ortega (78 y.o. female)  Date: 9/27/2022  Primary Diagnosis: Dizziness [R42]       Precautions:        ASSESSMENT :  Based on the objective data described below, the patient presents with functional swallow of thins and soft solids. No overt s/s of aspiration. Her MRI did reveal a small R cerebellar stroke. NIH was zero. Her basic language and motor speech are intact. She was fully oriented. Skilled acute therapy provided by a speech-language pathologist is not indicated at this time. PLAN :  Recommendations:  No follow up needed  Discharge Recommendations: None     SUBJECTIVE:   Patient stated she worked at a [employer] until the age of 77. OBJECTIVE:   No past medical history on file. No past surgical history on file. Prior Level of Function/Home Situation: active, cuts her grass   Home Situation  Home Environment: Private residence  # Steps to Enter: 2  Rails to Enter: Yes  One/Two Story Residence: One story  Living Alone: Yes  Patient Expects to be Discharged to[de-identified] Home  Current DME Used/Available at Home: Grab bars  Tub or Shower Type: Tub/Shower combination  Diet prior to admission: reg/thins  Current Diet:  reg/thins   Cognitive and Communication Status:  Neurologic State: Alert  Orientation Level: Oriented X4  Cognition: Appropriate decision making, Appropriate for age attention/concentration, Follows commands  Perception: Appears intact  Perseveration: No perseveration noted     Oral Assessment:  Oral Assessment  Labial: No impairment  Dentition: Full  Lingual: No impairment  Mandible: No impairment  P.O. Trials:  Patient Position: upright in chair  Vocal quality prior to P.O.: No impairment  Consistency Presented: Thin liquid;Mechanical soft  How Presented: Self-fed/presented; Successive swallows;Cup/gulp     Bolus Acceptance: No impairment  Bolus Formation/Control: No impairment     Propulsion: No impairment  Oral Residue: None  Initiation of Swallow: No impairment  Laryngeal Elevation: Functional  Aspiration Signs/Symptoms: None  Pharyngeal Phase Characteristics: No impairment, issues, or problems              Oral Phase Severity: No impairment  Pharyngeal Phase Severity : No impairment  NOMS:   The NOMS functional outcome measure was used to quantify this patient's level of swallowing impairment. Based on the NOMS, the patient was determined to be at level 7 for swallow function     NOMS Swallowing Levels:  Level 1 (CN): NPO  Level 2 (CM): NPO but takes consistency in therapy  Level 3 (CL): Takes less than 50% of nutrition p.o. and continues with nonoral feedings; and/or safe with mod cues; and/or max diet restriction  Level 4 (CK): Safe swallow but needs mod cues; and/or mod diet restriction; and/or still requires some nonoral feeding/supplements  Level 5 (CJ): Safe swallow with min diet restriction; and/or needs min cues  Level 6 (CI): Independent with p.o.; rare cues; usually self cues; may need to avoid some foods or needs extra time  Level 7 ([address] [place] [address]): Independent for all p.o.  DONNIE. (2003). National Outcomes Measurement System (NOMS): Adult Speech-Language Pathology User's Guide. Pain:  Pain Scale 1: Numeric (0 - 10)  Pain Intensity 1: 0     After treatment:   Patient left in no apparent distress sitting up in chair    COMMUNICATION/EDUCATION:   Patient was educated regarding her ability to eat and drink safely and that she has no communication deficits. The patient's plan of care including recommendations, planned interventions, and recommended diet changes were discussed with: Registered nurse.      Thank you for this referral.  Tapan Caruso, SLP

## 2022-09-27 NOTE — PROGRESS NOTES
End of Shift Note     Bedside shift change report given to Mary Lou Forman (oncoming nurse) by Clyde Ariza RN (offgoing nurse). Report included the following information SBAR, Kardex, ED Summary, OR Summary, Procedure Summary, MAR, Accordion, Recent Results, and Med Rec Status     Shift worked:  days   Shift summary and any significant changes:     Echo completed  Neuro consult completed  GI consult completed  PT/OT/ SLP completed today  Event Monitor placed today  Evening discharged planned      Concerns for physician to address: None   Zone phone for oncoming shift:  [phone]         Patient Information  Omar Ramos  78 y.o.  9/26/2022 10:31 AM by Geovanna Pang MD. Omar Ramos was admitted from Home     Problem List       Patient Active Problem List     Diagnosis Date Noted    Dizziness 09/26/2022      No past medical history on file. Core Measures:  CVA: Yes Yes        Activity:  Activity Level: Up with Assistance  Number times ambulated in hallways past shift: 0  Number of times OOB to chair past shift: 0     Cardiac:   Cardiac Monitoring: Yes      Cardiac Rhythm: Sinus Rhythm     Access:   Current line(s): PIV      Genitourinary:   Urinary status: voiding        Respiratory:   O2 Device: None (Room air)  Chronic home O2 use?: NO  Incentive spirometer at bedside: YES     GI:  Current diet:  ADULT DIET Regular; Low Fat/Low Chol/High Fiber/2 gm Na  Passing flatus: YES  Tolerating current diet: NO     Pain Management:   Patient states pain is manageable on current regimen: YES     Skin:  Kolton Score: 18  Interventions: increase time out of bed    Patient Safety:  Fall Score:  Total Score: 3  Interventions: bed/chair alarm, assistive device (walker, cane, etc), gripper socks, and stay with me (per policy)  High Fall Risk: Yes  @Rollbelt  @dexterity to release roll belt  Yes/No ( must document dexterity  here by stating Yes or No here, otherwise this is a restraint and must follow restraint documentation policy.)     DVT prophylaxis:  DVT prophylaxis Med- No  DVT prophylaxis SCD or CALEB- No      Wounds: (If Applicable)  Wounds- No  Location      Active Consults:  IP CONSULT TO GASTROENTEROLOGY  IP CONSULT TO NEUROLOGY     Length of Stay:  Expected LOS: - - -  Actual LOS: 1  Discharge Plan: Yes home when stable

## 2022-09-27 NOTE — PROGRESS NOTES
Problem: Falls - Risk of  Goal: *Absence of Falls  Description: Document Dione Patino Fall Risk and appropriate interventions in the flowsheet.   9/27/2022 1817 by Dar Norris RN  Outcome: Resolved/Met  9/27/2022 0743 by Dar Norris RN  Outcome: Progressing Towards Goal  Note: Fall Risk Interventions:  Mobility Interventions: Bed/chair exit alarm              Elimination Interventions: Call light in reach    History of Falls Interventions: Bed/chair exit alarm         Problem: Patient Education: Go to Patient Education Activity  Goal: Patient/Family Education  9/27/2022 1817 by Dar Norris RN  Outcome: Resolved/Met  9/27/2022 0743 by Dar Norris RN  Outcome: Progressing Towards Goal     Problem: Patient Education: Go to Patient Education Activity  Goal: Patient/Family Education  9/27/2022 1817 by Dar Norris RN  Outcome: Resolved/Met  9/27/2022 0743 by Dar Norris RN  Outcome: Progressing Towards Goal     Problem: TIA/CVA Stroke: 0-24 hours  Goal: Off Pathway (Use only if patient is Off Pathway)  9/27/2022 1817 by Dar Norris RN  Outcome: Resolved/Met  9/27/2022 0743 by Dar Norris RN  Outcome: Progressing Towards Goal  Goal: Activity/Safety  9/27/2022 1817 by Dar Norris RN  Outcome: Resolved/Met  9/27/2022 0743 by Dar Norris RN  Outcome: Progressing Towards Goal  Goal: Consults, if ordered  9/27/2022 1817 by Dar Norris RN  Outcome: Resolved/Met  9/27/2022 0743 by Dar Norris RN  Outcome: Progressing Towards Goal  Goal: Diagnostic Test/Procedures  9/27/2022 1817 by Dar Norris RN  Outcome: Resolved/Met  9/27/2022 0743 by Dar Norris RN  Outcome: Progressing Towards Goal  Goal: Nutrition/Diet  9/27/2022 1817 by Dar Norris RN  Outcome: Resolved/Met  9/27/2022 0743 by Dar Norris RN  Outcome: Progressing Towards Goal  Goal: Discharge Planning  9/27/2022 1817 by Dar Norris RN  Outcome: Resolved/Met  9/27/2022 0743 by Herlinda Cuenca, RN  Outcome: Progressing Towards Goal  Goal: Medications  9/27/2022 1817 by Herlinda Cuenca, RN  Outcome: Resolved/Met  9/27/2022 0743 by Herlinda Cuenca, RN  Outcome: Progressing Towards Goal  Goal: Respiratory  9/27/2022 1817 by Herlinda Cuenca, RN  Outcome: Resolved/Met  9/27/2022 0743 by Herlinda Cuenca, RN  Outcome: Progressing Towards Goal  Goal: Treatments/Interventions/Procedures  9/27/2022 1817 by Herlinda Cuenca, RN  Outcome: Resolved/Met  9/27/2022 0743 by Herlinda Cuenca, RN  Outcome: Progressing Towards Goal  Goal: Minimize risk of bleeding post-thrombolytic infusion  9/27/2022 1817 by Herlinda Cuenca, RN  Outcome: Resolved/Met  9/27/2022 0743 by Herlinda Cuenca, RN  Outcome: Progressing Towards Goal  Goal: Monitor for complications post-thrombolytic infusion  9/27/2022 1817 by Herlinda Cuenca, RN  Outcome: Resolved/Met  9/27/2022 0743 by Herlinda Cuenca, RN  Outcome: Progressing Towards Goal  Goal: Psychosocial  9/27/2022 1817 by Herlinda Cuenca, RN  Outcome: Resolved/Met  9/27/2022 0743 by Herlinda Cuenca, RN  Outcome: Progressing Towards Goal  Goal: *Hemodynamically stable  9/27/2022 1817 by Herlinda Cuenca, RN  Outcome: Resolved/Met  9/27/2022 0743 by Herlinda Cuenca, RN  Outcome: Progressing Towards Goal  Goal: *Neurologically stable  Description: Absence of additional neurological deficits    9/27/2022 1817 by Herlinda Cuenca, RN  Outcome: Resolved/Met  9/27/2022 0743 by Herlinda Cuenca, RN  Outcome: Progressing Towards Goal  Goal: *Verbalizes anxiety and depression are reduced or absent  9/27/2022 1817 by Herlinda Cuenca, RN  Outcome: Resolved/Met  9/27/2022 0743 by Herlinda Cuenca, RN  Outcome: Progressing Towards Goal  Goal: *Absence of Signs of Aspiration on Current Diet  9/27/2022 1817 by Herlinda Cuenca, RN  Outcome: Resolved/Met  9/27/2022 0743 by Herlinda Cuenca, RN  Outcome: Progressing Towards Goal  Goal: *Absence of deep venous thrombosis signs and symptoms(Stroke Metric)  9/27/2022 1817 by Miguel Kaur RN  Outcome: Resolved/Met  9/27/2022 0743 by Miguel Kaur RN  Outcome: Progressing Towards Goal  Goal: *Ability to perform ADLs and demonstrates progressive mobility and function  9/27/2022 1817 by Miguel Kaur RN  Outcome: Resolved/Met  9/27/2022 0743 by Miguel Kaur RN  Outcome: Progressing Towards Goal  Goal: *Stroke education started(Stroke Metric)  9/27/2022 1817 by Miguel Kaur RN  Outcome: Resolved/Met  9/27/2022 0743 by Miguel Kaur RN  Outcome: Progressing Towards Goal  Goal: *Dysphagia screen performed(Stroke Metric)  9/27/2022 1817 by Miguel Kaur RN  Outcome: Resolved/Met  9/27/2022 0743 by Miguel Kaur RN  Outcome: Progressing Towards Goal  Goal: *Rehab consulted(Stroke Metric)  9/27/2022 1817 by Miguel Kaur RN  Outcome: Resolved/Met  9/27/2022 0743 by Miguel Kaur RN  Outcome: Progressing Towards Goal     Problem: TIA/CVA Stroke: Day 2 Until Discharge  Goal: Off Pathway (Use only if patient is Off Pathway)  9/27/2022 1817 by Miguel Kaur RN  Outcome: Resolved/Met  9/27/2022 0743 by Miguel Kaur RN  Outcome: Progressing Towards Goal  Goal: Activity/Safety  9/27/2022 1817 by Miguel Kaur RN  Outcome: Resolved/Met  9/27/2022 0743 by Miguel Kaur RN  Outcome: Progressing Towards Goal  Goal: Diagnostic Test/Procedures  9/27/2022 1817 by Miguel Kaur RN  Outcome: Resolved/Met  9/27/2022 0743 by Miguel Kaur RN  Outcome: Progressing Towards Goal  Goal: Nutrition/Diet  9/27/2022 1817 by Miguel Kaur RN  Outcome: Resolved/Met  9/27/2022 0743 by Miguel Kaur RN  Outcome: Progressing Towards Goal  Goal: Discharge Planning  9/27/2022 1817 by Miguel Kaur RN  Outcome: Resolved/Met  9/27/2022 0743 by Miguel Kaur RN  Outcome: Progressing Towards Goal  Goal: Medications  9/27/2022 1817 by Miguel Kaur, RN  Outcome: Resolved/Met  9/27/2022 0743 by Tj Westbrook Madelyn Chawla RN  Outcome: Progressing Towards Goal  Goal: Respiratory  9/27/2022 1817 by Pelon Elaine RN  Outcome: Resolved/Met  9/27/2022 0743 by Pelon Elaine RN  Outcome: Progressing Towards Goal  Goal: Treatments/Interventions/Procedures  9/27/2022 1817 by Pelon Elaine RN  Outcome: Resolved/Met  9/27/2022 0743 by Pelon Elaine RN  Outcome: Progressing Towards Goal  Goal: Psychosocial  9/27/2022 1817 by Pelon Elaine RN  Outcome: Resolved/Met  9/27/2022 0743 by Pelon Elaine RN  Outcome: Progressing Towards Goal  Goal: *Verbalizes anxiety and depression are reduced or absent  9/27/2022 1817 by Pelon Elaine RN  Outcome: Resolved/Met  9/27/2022 0743 by Pelon Elaine RN  Outcome: Progressing Towards Goal  Goal: *Absence of aspiration  9/27/2022 1817 by Pelon Elaine RN  Outcome: Resolved/Met  9/27/2022 0743 by Pelon Elaine RN  Outcome: Progressing Towards Goal  Goal: *Absence of deep venous thrombosis signs and symptoms(Stroke Metric)  9/27/2022 1817 by Pelon Elaine RN  Outcome: Resolved/Met  9/27/2022 0743 by Pelon Elaine RN  Outcome: Progressing Towards Goal  Goal: *Optimal pain control at patient's stated goal  9/27/2022 1817 by Pelon Elaine RN  Outcome: Resolved/Met  9/27/2022 0743 by Pelon Elaine RN  Outcome: Progressing Towards Goal  Goal: *Tolerating diet  9/27/2022 1817 by Pelon Elaine RN  Outcome: Resolved/Met  9/27/2022 0743 by Pelon Elaine RN  Outcome: Progressing Towards Goal  Goal: *Ability to perform ADLs and demonstrates progressive mobility and function  9/27/2022 1817 by Pelon Elaine RN  Outcome: Resolved/Met  9/27/2022 0743 by Pelon Elaine RN  Outcome: Progressing Towards Goal  Goal: *Stroke education continued(Stroke Metric)  9/27/2022 1817 by Pelon Elaine RN  Outcome: Resolved/Met  9/27/2022 0743 by Pelon Elaine RN  Outcome: Progressing Towards Goal     Problem: Ischemic Stroke: Discharge Outcomes  Goal: *Verbalizes anxiety and depression are reduced or absent  9/27/2022 1817 by Caesar Cox RN  Outcome: Resolved/Met  9/27/2022 0743 by Caesar Cox RN  Outcome: Progressing Towards Goal  Goal: *Verbalize understanding of risk factor modification(Stroke Metric)  9/27/2022 1817 by Caesar Cox RN  Outcome: Resolved/Met  9/27/2022 0743 by Caesar Cox RN  Outcome: Progressing Towards Goal  Goal: *Hemodynamically stable  9/27/2022 1817 by Caesar Cox RN  Outcome: Resolved/Met  9/27/2022 0743 by Caesar Cox RN  Outcome: Progressing Towards Goal  Goal: *Absence of aspiration pneumonia  9/27/2022 1817 by Caesar Cox RN  Outcome: Resolved/Met  9/27/2022 0743 by Caesar Cox RN  Outcome: Progressing Towards Goal  Goal: *Aware of needed dietary changes  9/27/2022 1817 by Caesar Cox RN  Outcome: Resolved/Met  9/27/2022 0743 by Caesar Cox RN  Outcome: Progressing Towards Goal  Goal: *Verbalize understanding of prescribed medications including anti-coagulants, anti-lipid, and/or anti-platelets(Stroke Metric)  9/27/2022 1817 by Caesar Cox RN  Outcome: Resolved/Met  9/27/2022 0743 by Caesar Cox RN  Outcome: Progressing Towards Goal  Goal: *Tolerating diet  9/27/2022 1817 by Caesar Cox RN  Outcome: Resolved/Met  9/27/2022 0743 by Caesar Cox RN  Outcome: Progressing Towards Goal  Goal: *Aware of follow-up diagnostics related to anticoagulants  9/27/2022 1817 by Caesar Cox RN  Outcome: Resolved/Met  9/27/2022 0743 by Caesar Cox RN  Outcome: Progressing Towards Goal  Goal: *Ability to perform ADLs and demonstrates progressive mobility and function  9/27/2022 1817 by Caesar Cox RN  Outcome: Resolved/Met  9/27/2022 0743 by Caesar Cox RN  Outcome: Progressing Towards Goal  Goal: *Absence of DVT(Stroke Metric)  9/27/2022 1817 by Caesar Cox RN  Outcome: Resolved/Met  9/27/2022 0743 by Caesar Cox RN  Outcome: Progressing Towards Goal  Goal: *Absence of aspiration  9/27/2022 1817 by Herlinda Cuenca, RN  Outcome: Resolved/Met  9/27/2022 0743 by Herlinda Cuenca, RN  Outcome: Progressing Towards Goal  Goal: *Optimal pain control at patient's stated goal  9/27/2022 1817 by Herlinda Cuenca, RN  Outcome: Resolved/Met  9/27/2022 0743 by Herlinda Cuenca, RN  Outcome: Progressing Towards Goal  Goal: *Home safety concerns addressed  9/27/2022 1817 by Herlinda Cuenca, RN  Outcome: Resolved/Met  9/27/2022 0743 by Herlinda Cuenca, RN  Outcome: Progressing Towards Goal  Goal: *Describes available resources and support systems  9/27/2022 1817 by Herlinda Cuenca, RN  Outcome: Resolved/Met  9/27/2022 0743 by Herlinda Cuenca, RN  Outcome: Progressing Towards Goal  Goal: *Verbalizes understanding of activation of EMS(911) for stroke symptoms(Stroke Metric)  9/27/2022 1817 by Herlinda Cuenca, RN  Outcome: Resolved/Met  9/27/2022 0743 by Herlinda Cuenca, RN  Outcome: Progressing Towards Goal  Goal: *Understands and describes signs and symptoms to report to providers(Stroke Metric)  9/27/2022 1817 by Herlinda Cuenca, RN  Outcome: Resolved/Met  9/27/2022 0743 by Herlinda Cuenca, RN  Outcome: Progressing Towards Goal  Goal: *Neurolgocially stable (absence of additional neurological deficits)  9/27/2022 1817 by Herlinda Cuenca, RN  Outcome: Resolved/Met  9/27/2022 0743 by Herlinda Cuenca, RN  Outcome: Progressing Towards Goal  Goal: Faheem Wood importance of follow-up with primary care physician(Stroke Metric)  9/27/2022 1817 by Herlinda Cuenca, RN  Outcome: Resolved/Met  9/27/2022 0743 by Herlinda Cuenca, RN  Outcome: Progressing Towards Goal  Goal: *Smoking cessation discussed,if applicable(Stroke Metric)  9/27/2022 1817 by Herlinda Cuenca, RN  Outcome: Resolved/Met  9/27/2022 0743 by Herlinda Cuenca, RN  Outcome: Progressing Towards Goal  Goal: *Depression screening completed(Stroke Metric)  9/27/2022 1817 by Scarlett lOiver A, RN  Outcome: Resolved/Met  9/27/2022 0743 by Dar Norris RN  Outcome: Progressing Towards Goal     Problem: Pressure Injury - Risk of  Goal: *Prevention of pressure injury  Description: Document Kolton Scale and appropriate interventions in the flowsheet.   9/27/2022 1817 by Dar Norris RN  Outcome: Resolved/Met  9/27/2022 0743 by Dar Norris RN  Outcome: Progressing Towards Goal     Problem: Patient Education: Go to Patient Education Activity  Goal: Patient/Family Education  9/27/2022 1817 by Dar Norris RN  Outcome: Resolved/Met  9/27/2022 0743 by Dar Norris RN  Outcome: Progressing Towards Goal     Problem: Patient Education: Go to Patient Education Activity  Goal: Patient/Family Education  Outcome: Resolved/Met     Problem: Patient Education: Go to Patient Education Activity  Goal: Patient/Family Education  Outcome: Resolved/Met

## 2022-09-27 NOTE — PROGRESS NOTES
-MRI pending completion of MRI Safety Screening Form. -Patient cannot be scanned until this form is completed. -Please complete MRI History and Safety Screening Form,   including signatures    - CALL MRI when this has been successfully completed at [phone].

## 2022-09-27 NOTE — PROGRESS NOTES
Hospital follow-up NEW PATIENT PCP transitional care appointment has been scheduled with FREDIS Loevlace on 10/5/22 at 1020. Pending patient discharge.   Elissa Gregory, Care Management Assistant

## 2022-09-27 NOTE — PROGRESS NOTES
Problem: Mobility Impaired (Adult and Pediatric)  Goal: *Acute Goals and Plan of Care (Insert Text)  Description:   FUNCTIONAL STATUS PRIOR TO ADMISSION: Patient was independent and active without use of DME.    HOME SUPPORT PRIOR TO ADMISSION: The patient lived alone with family to provide assistance. Physical Therapy Goals  Initiated 9/27/2022  1. Patient will move from supine to sit and sit to supine  in bed with independence within 7 day(s). 2.  Patient will transfer from bed to chair and chair to bed with independence using the least restrictive device within 7 day(s). 3.  Patient will perform sit to stand with independence within 7 day(s). 4.  Patient will ambulate with independence for 300 feet with the least restrictive device within 7 day(s). 5.  Patient will ascend/descend 3 stairs with 1 handrail(s) with independence within 7 day(s). 6.  Patient will improve Prescott Balance score by 7 points within 7 days. Outcome: Progressing Towards Goal     PHYSICAL THERAPY EVALUATION- NEURO POPULATION  Patient: Francisco Cornell (78 y.o. female)  Date: 9/27/2022  Primary Diagnosis: Dizziness [R42]       Precautions: fall         ASSESSMENT  Based on the objective data described below, the patient presents with impaired balance and altered gait. Pt was received in supine and cleared by nursing to mobilize. She was able to perform all mobility at a CGA level or higher. Came to the EOB and MMT 5/5 bilaterally in LEs. Stood and ambulated into the andino and performed the PRESCOTT with score listed below. Noted increased scissoring and path deviations, noted loss of balance which she was able to recover with overall min A during quick directional changes. She was returned to the room and left sitting up in the chair at the end of the session. Current Level of Function Impacting Discharge (mobility/balance): [facility]    Functional Outcome Measure:  The patient scored Total: 46/56 on the Prescott Balance Assessment which is indicative of low fall risk. Other factors to consider for discharge:      Patient will benefit from skilled therapy intervention to address the above noted impairments. PLAN :  Recommendations and Planned Interventions: bed mobility training, transfer training, gait training, therapeutic exercises, patient and family training/education, and therapeutic activities      Frequency/Duration: Patient will be followed by physical therapy:  2 times a week to address goals. Recommendation for discharge: (in order for the patient to meet his/her long term goals)  Outpatient physical therapy follow up recommended for higher level balance    This discharge recommendation:  Has been made in collaboration with the attending provider and/or case management    IF patient discharges home will need the following DME: none         SUBJECTIVE:   Patient stated oh yea I can drive to my appointments .     OBJECTIVE DATA SUMMARY:   HISTORY:    No past medical history on file. No past surgical history on file. Personal factors and/or comorbidities impacting plan of care:     Home Situation  Home Environment: Private residence  # Steps to Enter: 2  Rails to Enter: Yes  One/Two Story Residence: One story  Living Alone: Yes  Patient Expects to be Discharged to[de-identified] Home  Current DME Used/Available at Home: Grab bars  Tub or Shower Type: Tub/Shower combination    EXAMINATION/PRESENTATION/DECISION MAKING:   Critical Behavior:  Neurologic State: Alert  Orientation Level: Oriented X4  Cognition: Appropriate decision making, Appropriate for age attention/concentration, Follows commands, Recognition of people/places     Hearing:     Skin:  intact  Edema: none  Range Of Motion:  AROM: Within functional limits           PROM: Within functional limits           Strength:    Strength:  Within functional limits                    Tone & Sensation:   Tone: Normal              Sensation: Intact               Coordination:  Coordination: Within functional limits  Vision:      Functional Mobility:  Bed Mobility:  Rolling: Independent  Supine to Sit: Independent     Scooting: Independent  Transfers:  Sit to Stand: Stand-by assistance  Stand to Sit: Stand-by assistance                       Balance:   Sitting: Intact  Standing: Impaired  Standing - Static: Good  Standing - Dynamic : Fair  Ambulation/Gait Training:  Distance (ft): 300 Feet (ft)  Assistive Device: Gait belt  Ambulation - Level of Assistance: Contact guard assistance        Gait Abnormalities: Decreased step clearance; Path deviations;Scissoring        Base of Support: Narrowed     Speed/Sofía: Fluctuations  Step Length: Left shortened;Right shortened            Functional Measure  Higgins Balance Test:    Sitting to Standin  Standing Unsupported: 4  Sitting with Back Unsupported: 4  Standing to Sittin  Transfers: 4  Standing Unsupported with Eyes Closed: 3  Standing Unsupported with Feet Together: 3  Reach Forward with Outstretched Arm: 3   Object: 3  Turn to Look Over Shoulders: 4  Turn 360 Degrees: 1  Alternate Foot on Step/Stool: 3  Standing Unsupported One Foot in Front: 3  Stand on One Leg: 3  Total: 46/56         56=Maximum possible score;   0-20=High fall risk  21-40=Moderate fall risk   41-56=Low fall risk        Physical Therapy Evaluation Charge Determination   History Examination Presentation Decision-Making   MEDIUM  Complexity : 1-2 comorbidities / personal factors will impact the outcome/ POC  LOW Complexity : 1-2 Standardized tests and measures addressing body structure, function, activity limitation and / or participation in recreation  LOW Complexity : Stable, uncomplicated  Other outcome measures HIGGINS  LOW       Based on the above components, the patient evaluation is determined to be of the following complexity level: LOW     Pain Ratin/10    Activity Tolerance:   Good      After treatment patient left in no apparent distress:   Sitting in chair, Call bell within reach, Bed / chair alarm activated, and Caregiver / family present    COMMUNICATION/EDUCATION:   The patients plan of care was discussed with: Occupational therapist and Registered nurse. Awaiting neuro to talk with pt before further education      Fall prevention education was provided and the patient/caregiver indicated understanding. and Patient/family agree to work toward stated goals and plan of care.     Thank you for this referral.  Luis Distance, PT, DPT   Time Calculation: 17 mins

## 2022-09-27 NOTE — DISCHARGE SUMMARY
Hospitalist Discharge Summary     Patient ID:  Omar Ramos  [id number]  78 y.o.  1940 9/26/2022    PCP on record: None    Admit date: 9/26/2022  Discharge date and time: 9/27/2022    DISCHARGE DIAGNOSIS:    Small acute ischemic right cerebellum stroke    CONSULTATIONS:  IP CONSULT TO GASTROENTEROLOGY  IP CONSULT TO NEUROLOGY    Excerpted HPI from H&P of Lencho Becerra MD:  Ms. Christelle Solano is a 78 y.o. female with no medical major past medical problems, has not seen her PCP in 10 years. Actively healthy living. Woke up this morning having some dizzy spells vomiting and ataxia. She was brought in as code stroke to ED, CT head and CT angio head and neck was done which showed no acute stroke but chronic changes. It was felt by ER physician and MD that she would need to be further work-up with MRI brain and neurology evaluation. Patient was given meclizine with improvement in her symptoms. Patient otherwise denies any acute complaints to me no nausea, no abdominal pain, no diarrhea. Overall she is not excited about staying in the hospital and has been asking if she could be discharged home soon. On insistence she agreed to stay till work-up done for another 24-hour.       ______________________________________________________________________  DISCHARGE SUMMARY/HOSPITAL COURSE:  for full details see H&P, daily progress notes, labs, consult notes. The patient is 78years old woman with no significant prior medical history who was admitted to the hospital due to nausea. She also reported some vomiting on arrival.  Her initial CT head showed no acute abnormalities, CTA head and neck with no large vessel occlusions. MRI revealed small area of stroke in the cerebellum. She was started on aspirin, Plavix, Lipitor. Neurology recommended to discharge on aspirin 81 mg daily indefinitely, Plavix 75 mg for 21 days, Lipitor 40 mg daily indefinitely.   On abdomen CT abdomen revealed questionable colitis however patient had no fever, leukocytosis, abdominal pain. GI evaluated patient and recommended conservative management. No evidence of bacterial colitis so we will hold on antibiotics. I talked to the patient, patient's son, patient's daughter-in-law and I reviewed the plan with them and all questions were answered. I emphasized that no driving for 6 months at least.  Neurology also recommended cardiac event monitor for 30 days if echo is normal, echo just resulted normal and we placed order for 30-day cardiac event monitor to be placed before discharge. MRI brain on admission  1. Small area of acute ischemia right cerebellum as above. 2. No masses. 3. Nonspecific left cerebellar calcification likely chronic and related to a  prior hemorrhage or trauma. 4. Moderate amount of nonspecific white matter changes. _______________________________________________________________________  Patient seen and examined by me on discharge day. Pertinent Findings:  Gen:    Not in distress  Chest: Clear lungs  CVS:   Regular rhythm. No edema  Abd:  Soft, not distended, not tender  Neuro:  EOMs intact. No facial asymmetry. No aphasia or slurred speech. Symmetrical strength, Sensation grossly intact. Alert and oriented X 4.       _______________________________________________________________________  DISCHARGE MEDICATIONS:   Current Discharge Medication List        START taking these medications    Details   aspirin 81 mg chewable tablet Take 1 Tablet by mouth daily. Qty: 30 Tablet, Refills: 0  Start date: 9/28/2022      atorvastatin (LIPITOR) 40 mg tablet Take 1 Tablet by mouth nightly. Qty: 30 Tablet, Refills: 0  Start date: 9/27/2022      clopidogreL (PLAVIX) 75 mg tab Take 1 Tablet by mouth daily for 21 days. Qty: 21 Tablet, Refills: 0  Start date: 9/28/2022, End date: 10/19/2022      famotidine (PEPCID) 20 mg tablet Take 1 Tablet by mouth two (2) times a day.   Qty: 30 Tablet, Refills: 0  Start date: 9/27/2022               Patient Follow Up Instructions: Activity: Activity as tolerated  Diet: Resume previous diet      Follow-up Information       Follow up With Specialties Details Why Contact Humberto Richardson NP Nurse Practitioner Go on 10/5/2022 at 10:20am for your NEW PATIENT PCP hospital follow up. Please bring photo ID, insurance cards, copay, medication bottles and any completed forms.  [phone] [id number] [phone]      None    None ([phone]) Patient stated that they have no PCP            ________________________________________________________________    Risk of deterioration: Low    Condition at Discharge:  Stable  __________________________________________________________________    Disposition  Home with family, no needs    ____________________________________________________________________    Code Status: Full Code  ___________________________________________________________________      Total time in minutes spent coordinating this discharge (includes going over instructions, follow-up, prescriptions, and preparing report for sign off to her PCP) :  >30 minutes    Signed:  Bean Lee MD

## 2022-09-27 NOTE — PROGRESS NOTES
Reason for Admission:  Dizziness                     RUR Score:  10%                   Plan for utilizing home health:  [facility] vs outpatient        PCP: First and Last name:  None     Name of Practice:    Are you a current patient: Yes/No:    Approximate date of last visit:    Can you participate in a virtual visit with your PCP:                     Current Advanced Directive/Advance Care Plan: Full Code  CM confirmed with DIL that patient is a Full Code    Healthcare Decision Maker:   Click here to complete [address] including selection of the [address] Relationship (ie \"Primary\")           Abby JORDAN, [phone]                  Transition of Care Plan:                    CM spoke with patient's DIL over the phone. Patient lives at home alone. There are two steps to enter the home. Patient has no DME and is independent in care. Patient's family is her transport. Patient uses the [facility] on [address] [place] [address]. Family is going to discuss with patient whether outpatient therapy or HH will be better for patient.   Current Dispo: Home/self vs HH

## 2022-09-27 NOTE — PROGRESS NOTES
Problem: Self Care Deficits Care Plan (Adult)  Goal: *Acute Goals and Plan of Care (Insert Text)  Description: FUNCTIONAL STATUS PRIOR TO ADMISSION: Patient was independent and active without use of DME. Patient was independent for basic and instrumental ADLs. Patient still mows her lawn and goes for long walks in her neighborhood. HOME SUPPORT: The patient lived alone with son to provide assistance if needed. Occupational Therapy Goals  Initiated 9/27/2022   1. Patient will perform grooming standing at sink with independence within 7 day(s). 2.  Patient will perform upper body dressing with independence within 7 day(s). 3.  Patient will perform lower body dressing with modified independence within 7 day(s). 4.  Patient will perform toilet transfers with independence within 7 day(s). 5.  Patient will perform all aspects of toileting with independence within 7 day(s). 6.  Patient will participate in upper extremity therapeutic exercise/activities with independence within 7 day(s). 7.  Patient will utilize energy conservation techniques during functional activities with no cues within 7 day(s). Outcome: Not Met   OCCUPATIONAL THERAPY EVALUATION  Patient: Akash Dorsey (78 y.o. female)  Date: 9/27/2022  Primary Diagnosis: Dizziness [R42]       Precautions: Fall, Bed Alarm    ASSESSMENT  Based on the objective data described below, the patient presents with decreased independence in self-care and functional mobility secondary to general weakness, impaired balance, decreased insight/safety awareness, and decreased activity tolerance. Patient received semisupine in bed with family present in room and cleared for therapy by nursing. Patient completed supine > sit with independence and demonstrated intact sitting balance while donning socks. Patient demonstrated intact ROM/strength/coordination in BUE during fugl [place] assessment.  Patient stood and ambulated in hallway with PT present and demonstrated impaired balance requiring occasional contact guard assist to correct. Patient returned to room and agreed to end session sitting in recliner chair. Patient and family reported neurologist has not been in to discuss MRI findings therefore BEFAST education postponed. Patient was left sitting in recliner chair with all needs met, VSS, family present in room, and chair alarmed. Patient would benefit from 1-2 additional skilled OT sessions during acute hospital stay to focus on higher level ADLs. Anticipate patient can return home with no skilled OT needs. Current Level of Function Impacting Discharge (ADLs/self-care): set-up to min assist for self-care, independent to contact guard assist for functional mobility     Functional Outcome Measure: The patient scored Total A-D  Total A-D (Motor Function): 66/66 on the Fugl-Smith Assessment which is indicative of no impairment in upper extremity functional status. Other factors to consider for discharge: fall risk, lives alone, good family support     Patient will benefit from skilled therapy intervention to address the above noted impairments. PLAN :  Recommendations and Planned Interventions: self care training, functional mobility training, therapeutic exercise, balance training, therapeutic activities, endurance activities, patient education, home safety training, and family training/education    Frequency/Duration: Patient will be followed by occupational therapy 2 times a week to address goals. Recommendation for discharge: (in order for the patient to meet his/her long term goals)  No skilled occupational therapy/ follow up rehabilitation needs identified at this time. This discharge recommendation:  Has been made in collaboration with the attending provider and/or case management    IF patient discharges home will need the following DME: none       SUBJECTIVE:   Patient stated I want to go home today.     OBJECTIVE DATA SUMMARY: HISTORY:   No past medical history on file. No past surgical history on file. Expanded or extensive additional review of patient history:     Home Situation  Home Environment: Private residence  # Steps to Enter: 2  Rails to Enter: Yes  One/Two Story Residence: One story  Living Alone: Yes  Patient Expects to be Discharged to[de-identified] Home  Current DME Used/Available at Home: Grab bars  Tub or Shower Type: Tub/Shower combination    Hand dominance: Right    EXAMINATION OF PERFORMANCE DEFICITS:  Cognitive/Behavioral Status:  Neurologic State: Alert  Orientation Level: Oriented X4  Cognition: Follows commands  Perception: Appears intact  Perseveration: No perseveration noted  Safety/Judgement: Awareness of environment; Fall prevention;Decreased insight into deficits    Hearing: Auditory  Auditory Impairment: None    Vision/Perceptual:    Acuity: Within Defined Limits    Corrective Lenses: Reading glasses    Range of Motion:  AROM: Within functional limits  PROM: Within functional limits    Strength:  Strength: Within functional limits    Coordination:  Coordination: Within functional limits  Fine Motor Skills-Upper: Left Intact; Right Intact    Gross Motor Skills-Upper: Left Intact; Right Intact    Tone & Sensation:  Tone: Normal  Sensation: Intact    Balance:  Sitting: Intact  Standing: Impaired  Standing - Static: Good  Standing - Dynamic : Fair    Functional Mobility and Transfers for ADLs:  Bed Mobility:  Rolling: Independent  Supine to Sit: Independent  Scooting: Independent    Transfers:  Sit to Stand: Stand-by assistance  Stand to Sit: Stand-by assistance  Bed to Chair: Stand-by assistance    ADL Assessment:  Feeding: Setup  Oral Facial Hygiene/Grooming: Setup;Supervision  Bathing: Minimum assistance  Upper Body Dressing: Setup;Supervision  Lower Body Dressing: Setup;Supervision  Toileting: Setup;Stand by assistance    ADL Intervention and task modifications:     Grooming  Position Performed: Seated in chair  Washing Hands: Set-up; Supervision  Cues: Verbal cues provided    Lower Body Dressing Assistance  Socks: Set-up; Supervision  Leg Crossed Method Used: Yes  Position Performed: Seated edge of bed  Cues: Don;Verbal cues provided    Cognitive Retraining  Safety/Judgement: Awareness of environment; Fall prevention;Decreased insight into deficits    Functional Measure:  Fugl-Msith Assessment of Motor Recovery after Stroke:   Upper Extremity Assessment: Yes    Reflex Activity  Flexors/Biceps/Fingers: Can be elicited  Extensors/Triceps: Can be elicited  Reflex Subtotal: 4    Volitional Movement Within Synergies  Shoulder Retraction: Full  Shoulder Elevation: Full  Shoulder Abduction (90 degrees): Full  Shoulder External Rotation: Full  Elbow Flexion: Full  Forearm Supination: Full  Shoulder Adduction/Internal Rotation: Full  Elbow Extension: Full  Forearm Pronation: Full  Subtotal: 18    Volitional Movement Mixing Synergies  Hand to Lumbar Spine: Full  Shoulder Flexion (0-90 degrees): Full  Pronation-Supination: Full  Subtotal: 6    Volitional Movement With Little or No Synergy  Shoulder Abduction (0-90 degrees): Full  Shoulder Flexion ( degrees): Full  Pronation/Supination: Full  Subtotal : 6    Normal Reflex Activity  Biceps, Triceps, Finger Flexors: Full  Subtotal : 2    Upper Extremity Total   Upper Extremity Total: 36    Wrist  Stability at 15 Degree Dorsiflexion: Full  Repeated Dorsiflexion/ Volar Flexion: Full  Stability at 15 Degree Dorsiflexion: Full  Repeated Dorsiflexion/ Volar Flexion: Full  Circumduction: Full  Wrist Total: 10    Hand  Mass Flexion: Full  Mass Extension: Full  Grasp A: Full  Grasp B: Full  Grasp C: Full  Grasp D: Full  Grasp E: Full  Hand Total: 14    Coordination/Speed  Tremor: None  Dysmetria: None  Time: <1s  Coordination/Speed Total : 6    Total A-D  Total A-D (Motor Function): 66/66     This is a reliable/valid measure of arm function after a neurological event.  It has established value to characterize functional status and for measuring spontaneous and therapy-induced recovery; tests proximal and distal motor functions. Fugl-Smith Assessment - UE scores recorded between five and 30 days post neurologic event can be used to predict UE recovery at six months post neurologic event. Severe = 0-21 points   Moderately Severe = 22-33 points   Moderate = 34-47 points   Mild = 48-66 points  Ellamae Burkitt, D., JN Marin, & ARVIN Dueñas (1992). Measurement of motor recovery after stroke: Outcome assessment and sample size requirements. Stroke, 23, pp. 8527-4652.   ------------------------------------------------------------------------------------------------------------------------------------------------------------------  MCID:  Stroke:   Tanisha Hubbard et al, 2001; n = 171; mean age 79 (5) years; assessed within 16 (12) days of stroke, Acute Stroke)  FMA Motor Scores from Admission to Discharge   10 point increase in FMA Upper Extremity = 1.5 change in discharge FIM   10 point increase in FMA Lower Extremity = 1.9 change in discharge FIM  MDC:   Stroke:   Ernesta Hammans et al, 2008, n = 14, mean age = 59.9 (14.6) years, assessed on average 14 (6.5) months post stroke, Chronic Stroke)   FMA = 5.2 points for the Upper Extremity portion of the assessment       Barthel Index:  Bathin  Bladder: 10  Bowels: 10  Groomin  Dressing: 10  Feeding: 10  Mobility: 10  Stairs: 5  Toilet Use: 10  Transfer (Bed to Chair and Back): 10  Total: 80/100      The Barthel ADL Index: Guidelines  1. The index should be used as a record of what a patient does, not as a record of what a patient could do. 2. The main aim is to establish degree of independence from any help, physical or verbal, however minor and for whatever reason. 3. The need for supervision renders the patient not independent. 4. A patient's performance should be established using the best available evidence.  Asking the patient, friends/relatives and nurses are the usual sources, but direct observation and common sense are also important. However direct testing is not needed. 5. Usually the patient's performance over the preceding 24-48 hours is important, but occasionally longer periods will be relevant. 6. Middle categories imply that the patient supplies over 50 per cent of the effort. 7. Use of aids to be independent is allowed. Score Interpretation (from [address] [facility] 83)    Independent   60-79 Minimally independent   40-59 Partially dependent   20-39 Very dependent   <20 Totally dependent     -Naomy Perez, Barthel, D.W. (1965). Functional evaluation: the Barthel Index. [address] ([address] [place] [address] (1997). The Barthel activities of daily living index: self-reporting versus actual performance in the old (> or = 75 years). Journal of [address] [place] [address] 45(7), [facility], J.Sergio.F, Arcelia Olmedo., Samreen Barrientos. (1999). Measuring the change in disability after inpatient rehabilitation; comparison of the responsiveness of the Barthel Index and Functional Victor Measure. Journal of Neurology, Neurosurgery, and Psychiatry, 66(4), 245-004. Srinivas Rivera, N.J.A, Ghulam Davis,  ARSLAN.J.OLLIE, & Lul Sebastian, MJOSEPH. (2004) Assessment of post-stroke quality of life in cost-effectiveness studies: The usefulness of the Barthel Index and the EuroQoL-5D. Quality of Life Research, 13, 336-64     Based on the above components, the patient evaluation is determined to be of the following complexity level: LOW   Pain Rating:  Patient did not c/o pain during session. Activity Tolerance:   Fair    After treatment patient left in no apparent distress:    Sitting in chair, Call bell within reach, Bed / chair alarm activated, and Caregiver / family present    COMMUNICATION/EDUCATION:   The patients plan of care was discussed with: Physical therapist and Registered nurse.      Home safety education was provided and the patient/caregiver indicated understanding., Patient/family have participated as able in goal setting and plan of care. , and Patient/family agree to work toward stated goals and plan of care. This patients plan of care is appropriate for delegation to PILAR.     Thank you for this referral.  Michelle Blackwood, OTR/L  Time Calculation: 19 mins

## 2022-09-27 NOTE — CONSULTS
Date of Consultation:  September 27, 2022    Referring Physician: Yuridia Del Angel MD     Reason for Consultation:  Dizziness, nausea     CC: Dizziness    History of Present Illness: Perla Schultz is a 78 y.o. female with no prior medical history who is currently admitted to the hospital after she developed acute onset nausea dizziness and vomiting on the day of arrival.  Patient reports that she was at her baseline state of health when she suddenly felt a wave of nausea that was very severe, over her. She then tried to stand up became very dizzy and unsteady. She also was diaphoretic and had a few episodes of vomiting. She attempted to walk however could not get very far without holding onto furniture. Her daughter-in-law came to check on her and found her on the floor diaphoretic and pale in the face. For this reason EMS was called and patient was brought to the hospital for further evaluation. Here she underwent work-up for possible stroke including a noncontrast head CT that showed no acute abnormalities and a CTA head and neck that showed no large vessel occlusion. She was admitted to the hospital for further evaluation. She did have an MRI of the brain that showed a small area of stroke. Today on my evaluation patient reports that she is doing well and feels that her symptoms have improved. She has been able to walk to the bathroom however still felt slightly unsteady.     Medical history  None as she has not been to the physician in more than 10 years     Family hx:   History    Social History     Tobacco Use    Smoking status: Not on file    Smokeless tobacco: Not on file   Substance Use Topics    Alcohol use: Not on file        No Known Allergies     Prior to Admission medications    Not on File       Review of Systems:    General, constitutional: negative  Eyes, vision: negative  Ears, nose, throat: negative  Cardiovascular, heart: negative  Respiratory: negative  Gastrointestinal: negative  Genitourinary: negative  Musculoskeletal: negative  Skin and integumentary: negative  Psychiatric: negative  Endocrine: negative  Neurological: negative, except for HPI  Hematologic/lymphatic: negative  Allergy/immunology: negative    PHYSICAL EXAMINATION:   Visit Vitals  BP (!) 164/72   Pulse 74   Temp 98.4 °F (36.9 °C)   Resp 18   Ht 5' 4\" (1.626 m)   Wt 110 lb (49.9 kg)   SpO2 98%   BMI 18.88 kg/m²       Physical Exam:  General:  no acute distress  Neck: no carotid bruits  Lungs: clear to auscultation  Heart:  no murmurs, regular rate and rhythm   Lower extremity: no edema    Neurological exam:  Mental Status: Awake, alert, oriented to person, place and time  Attention and Concentration: able to state the days of the week backwards   Speech and Language: no dysarthria. Able to name, repeat and follow commands   Fund of knowledge was preserved    Cranial nerves: II-XII  Pupils equal and reactive, visual fields intact by confrontation     Extraocular movements intact, no evidence of nystagmus or ptosis   Facial sensation intact   Facial movements symmetric   Hearing intact to soft rub bilaterally   Shoulder shrug symmetric and strong   Tongue protrusion full and midline without fasciculation or atrophy    Motor:   Normal tone and Bulk   Drift: No evidence of pronator drift     Strength testing:   deltoid triceps biceps Wrist ext. Wrist flex. intrinsics   Right 5 5 5 5 5 5   Left 5 5 5 5 5 5      Hip flex. Hip ext. Knee ext. Knee flex Dorsi flex Plantar flex   Right  5 5 5 5 5 5   Left  5 5 5 5 5 5       Sensory:  Intact to light touch and pinprick.   There is no extinction    Reflexes:   Biceps Triceps  Brachiorad Patellar Achilles Plantar Hoffmans   Right  2 2 2 2 2 Down Neg   Left  2 2 2 2 2 Down Neg      Cerebellar testing: Finger-nose-finger was intact    Gait was deferred due to concerns over patient safety    LAB DATA REVIEWED:    Lab Results   Component Value Date/Time    Hemoglobin A1c 5.7 (H) 09/27/2022 01:35 AM    Sodium 136 09/26/2022 11:20 AM    Potassium 3.9 09/26/2022 11:20 AM    Chloride 103 09/26/2022 11:20 AM    Glucose 193 (H) 09/26/2022 11:20 AM    BUN 16 09/26/2022 11:20 AM    Creatinine 0.83 09/26/2022 11:20 AM    Calcium 10.0 09/26/2022 11:20 AM    WBC 7.2 09/26/2022 11:20 AM    HCT 36.7 09/26/2022 11:20 AM    HGB 12.5 09/26/2022 11:20 AM    PLATELET 175 91/76/6095 11:20 AM       Stroke workup    MRI Brain  Independent review of the MRI of the brain reveals a tiny area of diffusion restriction in the right cerebellum her T2 FLAIR sequence does reveal a moderate amount of white matter disease    CTA head  Independent review of the CTA head and neck reveals some mild atherosclerosis at the carotid bifurcations bilaterally. There also does appear to be slightly beaded appearance of the cervical internal carotid arteries concerning for fibromuscular dysplasia    TTE:   Pending    Stroke labs:    HgBA1c    Lab Results   Component Value Date/Time    Hemoglobin A1c 5.7 (H) 09/27/2022 01:35 AM       LDL   Lab Results   Component Value Date/Time    LDL, calculated 128.2 (H) 09/27/2022 01:35 AM       EKG: Sinus bradycardia      Assessment and Plan: Perla Schultz is a 78 y.o. 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 Her CTA head and neck did not reveal significant atherosclerosis in the posterior circulation  - Recommend maintaining BP goal is less than 140/90 (this is the long term goal)   - would recommend to continue aspirin 81mg daily for secondary stroke prevention. We will additionally add Plavix 75 mg daily for a total of 21 days.   After this time she can continue on aspirin  - Risk factor modification: Hemoglobin A1c is 5.7% and LDL is 128  - please obtain TTE to rule out intracardiac thrombus   -If echocardiogram does not show evidence of a stroke, would obtain 30-day event monitor to rule out A. fib as a possible cause. - would monitor on telemetry to rule out arrhythmias    - please obtain PT/OT and speech consultations   - Patient was counselled on stroke education and understands if has additional symptoms of slurred speech, weakness, sensation loss or vision loss to call 911  - Discussed with benefits of a low fat, low sodium diet with the addition of multiple servings of fruits and vegetables. Also discussed importance of physical activity, at least 30 min 3 times a day. - Counselled on the importance of medication compliance     Hypertension: Appears to be slightly uncontrolled  -Patient may need to be started on antihypertensives for a blood pressure goal of 140/90. Hyperlipidemia:   -We will continue atorvastatin 40 mg daily for an LDL goal of less than 70.  -We did discuss dietary modifications to help lower cholesterol levels. We discussed extensively the importance of lifestyle modification including smoking cessation, diet, and incorporating exercise into daily routine. We did discuss [place] state law regarding driving after stroke. Patient and family verbalized understanding that she should not drive for 6 months given her stroke. Thank you for the consult, will sign off. Please call with any additional questions. Please have patient follow-up in neurology clinic in 1 to 2 months.       Nahid Cervantes MD

## 2022-09-27 NOTE — CONSULTS
Gastroenterology Consult  ([place], [place] for Dr. Scar Box)     Referring Physician: Dr. Valery Abreu Lencho    Consult Date: 9/27/2022     Subjective:     Chief Complaint: vomiting and dizziness    History of Present Illness: Henri Cyr is a 78 y.o. female who is seen in consultation for colitis. She developed acute N/V and dizziness yesterday which brought her to the ER. MRI brain shows: IMPRESSION  1. Small area of acute ischemia right cerebellum as above. 2. No masses. 3. Nonspecific left cerebellar calcification likely chronic and related to a  prior hemorrhage or trauma. 4. Moderate amount of nonspecific white matter changes. Neurology has been consulted. She is on ASA and lovenox. CT abd/pelvis with contrast showed IMPRESSION  1. Question low-grade colitis involving the ascending and transverse colon. 2.  Large amount stool in the rectum. 3.  Distended urinary bladder. 4.  Other incidental findings as above    She was started on Unasyn. Has had looser stools for the past several weeks, no more than once a day. No bood in the stool. No abd pain or F/C. No recent antibiotics. WBC normal. Hgb normal. Electrolytes normal.  Lactic acid initially 2.4, now 0.6. She is feeling much better this am and is on a regular diet. She has never had a colonoscopy. She does not smoke, drink alcohol regularly or use drugs    No family history of CRC, Colitis, Crohns    No past medical history on file. No past surgical history on file.      Social History     Tobacco Use    Smoking status: Not on file    Smokeless tobacco: Not on file   Substance Use Topics    Alcohol use: Not on file      No Known Allergies  Current Facility-Administered Medications   Medication Dose Route Frequency    sodium chloride (NS) flush 5-40 mL  5-40 mL IntraVENous Q8H    sodium chloride (NS) flush 5-40 mL  5-40 mL IntraVENous PRN    acetaminophen (TYLENOL) tablet 650 mg  650 mg Oral Q4H PRN    Or acetaminophen (TYLENOL) solution 650 mg  650 mg Per NG tube Q4H PRN    Or    acetaminophen (TYLENOL) suppository 650 mg  650 mg Rectal Q4H PRN    0.9% sodium chloride infusion  75 mL/hr IntraVENous CONTINUOUS    ondansetron (ZOFRAN) injection 4 mg  4 mg IntraVENous Q6H PRN    aspirin chewable tablet 81 mg  81 mg Oral DAILY    atorvastatin (LIPITOR) tablet 40 mg  40 mg Oral QHS    magnesium hydroxide (MILK OF MAGNESIA) 400 mg/5 mL oral suspension 30 mL  30 mL Oral DAILY PRN    famotidine (PEPCID) tablet 20 mg  20 mg Oral BID    enoxaparin (LOVENOX) injection 30 mg  30 mg SubCUTAneous Q24H    ampicillin-sulbactam (UNASYN) 3 g in 0.9% sodium chloride (MBP/ADV) 100 mL MBP  3 g IntraVENous Q6H        Review of Systems:  A detailed review of systems was performed as follows:  Constitutional:  Negative  Eyes:  No ocular sensitivity to the sun, blurred vision or double vision. ENMT:  No nose or sinus problems. Respiratory: No coughing, wheezing or sob  Cardiac:  No chest pain, exertional chest pain or palpitations  Gastrointestinal:  See history of the present illness  :   No pain with urination or hematuria  Musculoskeletal:  No arthritis or hot swollen joints. Endocrine:  No thyroid disease or diabetes  Psychiatric: No depression or feeling blue  Integumentary:  No skin rash or sensitivity to the sun. Neurologic:  see HPI  Heme-Lymphatic:  No history of anemia, no unexplained lumps or bumps      Objective:     Physical Exam:  Visit Vitals  BP (!) 156/67   Pulse 78   Temp 98.3 °F (36.8 °C)   Resp 18   Ht 5' 4\" (1.626 m)   Wt 49.9 kg (110 lb)   SpO2 98%   BMI 18.88 kg/m²        Gen: white female in nad  Skin:  Extremities and face reveal no rashes. HEENT: Sclerae anicteric. Cardiovascular: Regular rate and rhythm. No murmurs, gallops, or rubs. Respiratory:  Comfortable breathing with no accessory muscle use. Clear breath sounds with no wheezes, rales, or rhonchi.   GI:  Abdomen nondistended, soft, and nontender. Normal active bowel sounds. No enlargement of the liver or spleen. No masses palpable. Rectal:  no external lesions, normal resting sphincter tone, no fecal impaction, masses, pain/tenderness  Musculoskeletal:  No pitting edema of the lower legs. Extremities have good range of motion. Neurological:  Gross memory appears intact. Patient is alert and oriented. Psychiatric:  Mood appears appropriate with judgement intact. Lymphatic:  No cervical or supraclavicular adenopathy. Lab/Data Review:  CMP:   Lab Results   Component Value Date/Time     09/26/2022 11:20 AM    K 3.9 09/26/2022 11:20 AM     09/26/2022 11:20 AM    CO2 24 09/26/2022 11:20 AM    AGAP 9 09/26/2022 11:20 AM     (H) 09/26/2022 11:20 AM    BUN 16 09/26/2022 11:20 AM    CREA 0.83 09/26/2022 11:20 AM    GFRAA >60 09/26/2022 11:20 AM    GFRNA >60 09/26/2022 11:20 AM    CA 10.0 09/26/2022 11:20 AM    MG 2.1 09/26/2022 11:20 AM    ALB 4.0 09/26/2022 11:20 AM    TP 7.8 09/26/2022 11:20 AM    GLOB 3.8 09/26/2022 11:20 AM    AGRAT 1.1 09/26/2022 11:20 AM    ALT 24 09/26/2022 11:20 AM     CBC:   Lab Results   Component Value Date/Time    WBC 7.2 09/26/2022 11:20 AM    HGB 12.5 09/26/2022 11:20 AM    HCT 36.7 09/26/2022 11:20 AM     09/26/2022 11:20 AM     CT Results (most recent):  Results from Hospital Encounter encounter on 09/26/22    CT ABD PELV W CONT    Narrative  EXAM: CT ABD PELV W CONT    INDICATION: Severe nausea and vomiting    COMPARISON: No comparison    CONTRAST: 100 mL of Isovue-370. TECHNIQUE:  Following the uneventful intravenous administration of contrast, thin axial  images were obtained through the abdomen and pelvis. Coronal and sagittal  reconstructions were generated. Oral contrast was not administered. CT dose  reduction was achieved through use of a standardized protocol tailored for this  examination and automatic exposure control for dose modulation.     FINDINGS:  LOWER THORAX: No significant abnormality in the incidentally imaged lower chest.  LIVER: No mass. BILIARY TREE: Gallbladder is within normal limits. CBD is not dilated. SPLEEN: within normal limits. PANCREAS: No mass or ductal dilatation. ADRENALS: Unremarkable. KIDNEYS: No mass, calculus, or hydronephrosis. Small hypodensity left kidney too  small to characterize. STOMACH: Unremarkable. SMALL BOWEL: No dilatation or wall thickening. COLON: Questionable mild wall thickening without dilatation involving the  ascending and transverse colon. . Large amount stool in the rectum. APPENDIX: The appendix is not well visualized. There is possible small  hypodensities versus appendiceal stump with an appendicolith versus a clip. PERITONEUM: No ascites or pneumoperitoneum. RETROPERITONEUM: No lymphadenopathy or aortic aneurysm. REPRODUCTIVE ORGANS: Unremarkable  URINARY BLADDER: Distended urinary bladder. BONES: No destructive bone lesion. 3 mm anterolisthesis of L3 and L4. Degenerative changes with canal stenosis foraminal narrowing the lower lumbar  spine. ABDOMINAL WALL: No mass or hernia. ADDITIONAL COMMENTS: N/A    Impression  1. Question low-grade colitis involving the ascending and transverse colon. 2.  Large amount stool in the rectum. 3.  Distended urinary bladder. 4.  Other incidental findings as above  MRI Results (most recent):  Results from Hospital Encounter encounter on 09/26/22    MRI BRAIN W WO CONT    Narrative  EXAM:  MRI BRAIN W WO CONT    INDICATION:    Severe vertigo, CT calcification cerebellum    COMPARISON:  None. CONTRAST: 10 cc IV ProHance    TECHNIQUE:  Multiplanar multisequence acquisition without and with contrast of the brain. FINDINGS:  Diffusion imaging shows a focal small area of diffusion restriction in the  inferior medial aspect of the right cerebellar peduncle. No associated  hemorrhage or mass effect. There is no extra-axial fluid collection, hemorrhage or shift.   Flow voids in major vessels at the base of the brain are present. There is a moderate amount of nonspecific white matter changes. Ventricular size normal for age. The inferior left cerebellar calcifications show no associated enhancement and  may be related to remote trauma hemorrhage or trauma. There is no enhancing lesion or masses her. Impression  1. Small area of acute ischemia right cerebellum as above. 2. No masses. 3. Nonspecific left cerebellar calcification likely chronic and related to a  prior hemorrhage or trauma. 4. Moderate amount of nonspecific white matter changes. Assessment/Plan:     Active Problems:    Dizziness (9/26/2022)         77 y/o female presents with acute dizziness and vomiting and MRI shows small area of acute ischemia right cerebellum. Neurology consult is pending. She has been started on ASA and lovenox and is feeling much better. CT abd/pelvis incidentally shows questionable low grade colitis. There is no watery diarrhea, hematochezia, abd pain, fevers, chills or leukocytosis. She does in hindsight report her stools have been looser the past few weeks but no more frequent than once a day. I suggest sending stool for WBC's, enteric pathogens and O&P while she is here. I suggest outpatient follow up with GI and an outpatient colonoscopy given she has never had one when it is safe to hold blood thinners and when cleared from a neurologic standpoint. No clear indication for antibiotics in my opinion. We will see again on request.  Please call with questions/concerns. LESLEE Wynn  09/27/22  9:24 AM    The patient was seen and examined independently by me. I have discussed the case with the mid-level provider in detail. I have reviewed the patient's chart and the note. I personally performed all components of the history, physical, and medical decision making and agree with the assessment and plan, with minor modifications as noted.  Please see APPs note for full details. She presented with dizziness yesterday and  MRI brain shows: I  1. Small area of acute ischemia right cerebellum . 2. No masses. 3. Nonspecific left cerebellar calcification likely chronic and related to a  prior hemorrhage or trauma. 4. Moderate amount of nonspecific white matter changes. She is on ASA and lovenox. CT abd/pelvis with contrast also done(for nausea/vomiting) showed     1. Question low-grade colitis involving the ascending and transverse colon. 2.  Large amount stool in the rectum. Physical exam:  General:Awake,   HEENT:  EOMI,   Chest:  CTA,Heart: S1, S2, RRR  GI: Soft, NT, ND + bowel sounds  Extremities: No edema    Data Review:  reviewed     Impression:    N/V/dizziness  Acute cerebellar CVA  Abnl CTAP suggesting possible rt sided colitis and large rectal fecal stool burden    Plan and discussion:  79 yo with an acute CVA as noted above with possible right sided colitis but has no GI bleeding, pain or gross diarrhea. Agree with stool work up(some change in stool habits) as suggested and supportive treatment. After her acute CVA is addressed and she is out of the window, where she can safely hold her antiplatelets/anticoagulants, we could do an OP colonoscopy with biopsies which is currently contraindicated. We will sign off. Signed By: Funmi Michaud MD    9/27/2022  12:43 PM

## 2022-09-27 NOTE — ROUTINE PROCESS
End of Shift Note    Bedside shift change report given to Jason Alexander (oncoming nurse) by Evangelina Hill RN (offgoing nurse). Report included the following information SBAR, Kardex, ED Summary, OR Summary, Procedure Summary, MAR, Accordion, Recent Results, and Med Rec Status    Shift worked:  7p-7a   Shift summary and any significant changes:     Pt slept throughout the night. MRI completed. Neuro consult pending. No emesis or nausea. Concerns for physician to address: None   Zone phone for oncoming shift:  [phone]       Patient Information  Jerel Guzman  78 y.o.  9/26/2022 10:31 AM by Brock Haque MD. Jerel Guzman was admitted from Home    Problem List  Patient Active Problem List    Diagnosis Date Noted    Dizziness 09/26/2022     No past medical history on file. Core Measures:  CVA: Yes Yes      Activity:  Activity Level: Up with Assistance  Number times ambulated in hallways past shift: 0  Number of times OOB to chair past shift: 0    Cardiac:   Cardiac Monitoring: Yes      Cardiac Rhythm: Sinus Rhythm    Access:   Current line(s): PIV     Genitourinary:   Urinary status: voiding      Respiratory:   O2 Device: None (Room air)  Chronic home O2 use?: NO  Incentive spirometer at bedside: YES       GI:     Current diet:  ADULT DIET Regular; Low Fat/Low Chol/High Fiber/2 gm Na  Passing flatus: YES  Tolerating current diet: NO       Pain Management:   Patient states pain is manageable on current regimen: YES    Skin:  Kolton Score: 18  Interventions: increase time out of bed    Patient Safety:  Fall Score:  Total Score: 3  Interventions: bed/chair alarm, assistive device (walker, cane, etc), gripper socks, and stay with me (per policy)  High Fall Risk: Yes  @Rollbelt  @dexterity to release roll belt  Yes/No ( must document dexterity  here by stating Yes or No here, otherwise this is a restraint and must follow restraint documentation policy.)    DVT prophylaxis:  DVT prophylaxis Med- No  DVT prophylaxis SCD or CALEB- No     Wounds: (If Applicable)  Wounds- No  Location     Active Consults:  IP CONSULT TO GASTROENTEROLOGY  IP CONSULT TO NEUROLOGY    Length of Stay:  Expected LOS: - - -  Actual LOS: 1  Discharge Plan: Yes home when stable       Petrona Doe RN

## 2022-09-28 LAB
ATRIAL RATE: 53 BPM
CALCULATED P AXIS, ECG09: 73 DEGREES
CALCULATED R AXIS, ECG10: 59 DEGREES
CALCULATED T AXIS, ECG11: 44 DEGREES
DIAGNOSIS, 93000: NORMAL
P-R INTERVAL, ECG05: 160 MS
Q-T INTERVAL, ECG07: 490 MS
QRS DURATION, ECG06: 84 MS
QTC CALCULATION (BEZET), ECG08: 459 MS
VENTRICULAR RATE, ECG03: 53 BPM

## 2022-09-30 ENCOUNTER — TELEPHONE (OUTPATIENT)
Dept: NEUROLOGY | Age: 82
End: 2022-09-30

## 2022-10-02 LAB
BACTERIA SPEC CULT: NORMAL
SERVICE CMNT-IMP: NORMAL

## 2022-10-06 ENCOUNTER — HOSPITAL ENCOUNTER (OUTPATIENT)
Dept: PHYSICAL THERAPY | Age: 82
Discharge: HOME | End: 2022-10-06
Payer: MEDICARE

## 2022-10-06 PROCEDURE — 97110 THERAPEUTIC EXERCISES: CPT | Performed by: PHYSICAL THERAPIST

## 2022-10-06 PROCEDURE — 97162 PT EVAL MOD COMPLEX 30 MIN: CPT | Performed by: PHYSICAL THERAPIST

## 2022-10-06 NOTE — PROGRESS NOTES
PT INITIAL EVALUATION NOTE - [facility] 2-15    Patient Name: Lesa Walker  Date:10/6/2022  : 1940  [x]  Patient  Verified  Payor: Torres Sprague / Plan: [place] & B / Product Type: Medicare /    In time: 1:10pm  Out time: 2:00pm  Total Treatment Time (min): 50  Total Timed Codes (min): 25  1:1 Treatment Time ( [address] only): 25   Visit #: 1     Treatment Area: Other abnormalities of gait and mobility [R26.89]    SUBJECTIVE  Pain Level (0-10 scale): 0  Any medication changes, allergies to medications, adverse drug reactions, diagnosis change, or new procedure performed?: [] No    [x] Yes (see summary sheet for update)  Subjective: An MRI and CT scan showed a small cerebellar stroke. She felt nauseous, dizzy, and dry heaving. Her vision was also distorted when she was in the ER. She was leaning to the left, and was much better by Wednesday. She does work out with [employer], walks 5 miles, 10lb medicine. She was recently put on lipitor from the hospital and feels like its bothering her back. She was went shopping for 4-5 hours. She feels almost 100% better. She has 2-3 steps to get in the house, with bilateral individual handrails. She was bumping into things on the right side for the first few days.         OBJECTIVE/EXAMINATION  Posture:  scapular protraction bilaterally  Other Observations:  right shorter leg (~1/2in)  Gait and Functional Mobility:  WFL but slight right trunk lean and decreased right foot clearance with duration  Palpation: nt    LOWER QUARTER   MUSCLE STRENGTH  KEY       R  L  0 - No Contraction  L1, L2 Psoas  4  4  1 - Trace   L3 Quads  4  4  2 - Poor   L4 Tib Ant  3+  4-  3 - Fair    L5 EHL  4  4  4 - Good   S1 Peroneals  4-  4-  5 - Normal   S2 Hams  4  4    Flexibility: tight calves  Mobility Assessment: nt      MMT:               HIP ER: 3+/5 bilaterally              HIP Abd: 3/5 bilaterally  Neurological: Reflexes / Sensations: nt  Special Tests:         Long Sit: right shorter leg (~1/2in)           30s STS: 21x  5x STS: 7s  SLS: R= 2s; L= 4s  Tandem: R (fwd)= 12s; L (fwd)= 30s  TUs  MCTSIB: 1= 30s; 2= 7s LOB to the right; 4= 30s; 5= 8s significant lean; 18s LOB  2MWT: 6.25 laps     25 min Therapeutic Exercise:  [x] See flow sheet :   Rationale: increase ROM, increase strength, and improve coordination to improve the patients ability to perform daily activities.           With   [x] TE   [] TA   [] Neuro   [] SC   [] other: Patient Education: [x] Review HEP    [x] Progressed/Changed HEP based on:   [x] positioning   [x] body mechanics   [] transfers   [] heat/ice application    [] other:      Other Objective/Functional Measures: FOTO Functional Measure: 62/100                         Pain Level (0-10 scale) post treatment: 0    ASSESSMENT/Changes in Function:     [x]  See Plan of [address], PT 10/6/2022

## 2022-10-06 NOTE — THERAPY EVALUATION
[address] Physical Therapy  [address] [facility] [address] [place]  Phone: [phone] Fax: [phone]     Plan of Care/Statement of Necessity for Physical Therapy Services  2-15    Patient name: Lesa Walker  : 1940  Provider#: [id number]  Referral source: Haider Yanez DO      Medical/Treatment Diagnosis: Other abnormalities of gait and mobility [R26.89]    Prior Hospitalization: see medical history     Comorbidities: arthritis, back pain, stroke/TIA  Prior Level of Function: 20 min of exercise at least 3x/wk  Medications: Verified on Patient Summary List  Start of Care: 10/6/22      Onset Date: 22   The Plan of Care and following information is based on the information from the initial evaluation. Assessment/ key information: The patient presents with her daughter and son, who report that she was recently hospitalized for a possible CVA on 22. She had difficulty walking along with sitting and standing balance, listing to the left (according to her family). She lives alone and is normally very independent, working out daily with a 10lb medicine ball, jumps rope, and walks at least 10,000 steps daily. She now self reports \"feeling 100%, maybe. \" She has poor vestibular input as seen from an MCTSIB test. Her decreased hip/glute strength, decreased right dorsiflexion strength, tight calves, and left longer leg (~1/2in) alter her gait and exacerbates her stability issues. Though the patient may be at least 90% back to prior level of function, she does have some deficits that may be assisted through therapy. The patient will benefit from guided therapeutic interventions such as therex for strengthening and neuromuscular re-education, manual techniques for joint mobility and soft tissue extensibility, and modalities for pain management in order to improve functional mobility with daily activities.      Evaluation Complexity History MEDIUM  Complexity : 1-2 comorbidities / personal factors will impact the outcome/ POC ; Examination MEDIUM Complexity : 3 Standardized tests and measures addressing body structure, function, activity limitation and / or participation in recreation  ;Presentation MEDIUM Complexity : Evolving with changing characteristics  ; Clinical Decision Making MEDIUM Complexity : FOTO score of 26-74  Overall Complexity Rating: MEDIUM    Problem List: pain affecting function, decrease ROM, decrease strength, edema affecting function, impaired gait/ balance, decrease ADL/ functional abilitiies, decrease activity tolerance, decrease flexibility/ joint mobility, and decrease transfer abilities   Treatment Plan may include any combination of the following: Therapeutic exercise, Therapeutic activities, Neuromuscular re-education, Physical agent/modality, Gait/balance training, Manual therapy, Patient education, Self Care training, Functional mobility training, Home safety training, and Stair training  Patient / Family readiness to learn indicated by: asking questions, trying to perform skills, and interest  Persons(s) to be included in education: patient (P)  Barriers to Learning/Limitations: None  Patient Goal (s): improve strength  Patient Self Reported Health Status: excellent  Rehabilitation Potential: good    Short Term Goals: To be accomplished in 2 treatments:                         1.) The patient will be independent with their HEP consistently for at least one week. Long Term Goals: To be accomplished in 24 treatments:                         1.) The patient will improver her single leg stance (SLS) to at least 4s on the right and 6s on the left to show improvement in stability. 2.) The patient will self report no falls or issues with daily activities for at least 2 weeks.                          3.) The patient will improve their FOTO score from 62 to at least 65 to show improvements in functional mobility. 4.) The patient will improve her MCTSIB score from 75/120 to at least 80/120 to show improvement in vestibular input. Frequency / Duration: Patient to be seen 2 times per week for 24 treatments. Patient/ Caregiver education and instruction: self care, activity modification, and exercises    [x]  Plan of care has been reviewed with PTA        Certification Period: 10/6/22-1/4/23  Genia Wallace, PT 10/6/2022     ________________________________________________________________________    I certify that the above Therapy Services are being furnished while the patient is under my care. I agree with the treatment plan and certify that this therapy is necessary.     [facility] Signature:____________________  Date:____________Time: _________         Markos Maher, DO

## 2022-10-11 ENCOUNTER — HOSPITAL ENCOUNTER (OUTPATIENT)
Dept: PHYSICAL THERAPY | Age: 82
Discharge: HOME | End: 2022-10-11
Payer: MEDICARE

## 2022-10-11 PROCEDURE — 97112 NEUROMUSCULAR REEDUCATION: CPT | Performed by: PHYSICAL THERAPIST

## 2022-10-11 PROCEDURE — 97110 THERAPEUTIC EXERCISES: CPT | Performed by: PHYSICAL THERAPIST

## 2022-10-11 NOTE — PROGRESS NOTES
PT DAILY TREATMENT NOTE - [facility] 2-15    Patient Name: Gildardo Borges  Date:10/11/2022  : 1940  [x]  Patient  Verified  Payor: Emily Guillaume / Plan: [place] MEDICARE PART A & B / Product Type: Medicare /    In time: 2:33pm  Out time: 3:34pm  Total Treatment Time (min): 61  Total Timed Codes (min): 61  1:1 Treatment Time ([facility] only): 54   Visit #:  2    Treatment Area: Other abnormalities of gait and mobility [R26.89]    SUBJECTIVE  Pain Level (0-10 scale): 0  Any medication changes, allergies to medications, adverse drug reactions, diagnosis change, or new procedure performed?: [x] No    [] Yes (see summary sheet for update)  Subjective functional status/changes:   [] No changes reported  The patient reports that she was a little sore after last time, but not too bad. OBJECTIVE    46 min Therapeutic Exercise:  [x] See flow sheet :   Rationale: increase ROM, increase strength, and improve coordination to improve the patients ability to perform daily activities. 15 min Neuromuscular Re-education:  [x]  See flow sheet :   Rationale: improve coordination, improve balance, and increase proprioception  to improve the patients ability to perform daily activities. With   [x] TE   [] TA   [] Neuro   [] SC   [] other: Patient Education: [x] Review HEP    [x] Progressed/Changed HEP based on:   [x] positioning   [x] body mechanics   [] transfers   [] heat/ice application    [] other:      Other Objective/Functional Measures: none noted     Pain Level (0-10 scale) post treatment: 0    ASSESSMENT/Changes in Function:   The patient progressed with strengthening and mobility as tolerated.    Patient will continue to benefit from skilled PT services to modify and progress therapeutic interventions, address functional mobility deficits, address ROM deficits, address strength deficits, analyze and address soft tissue restrictions, analyze and cue movement patterns, analyze and modify body mechanics/ergonomics, assess and modify postural abnormalities, address imbalance/dizziness, and instruct in home and community integration to attain remaining goals. [x]  See Plan of Care  []  See progress note/recertification  []  See Discharge Summary         Progress towards goals / Updated goals: The patient is progressing towards goals.      PLAN  [x]  Upgrade activities as tolerated     [x]  Continue plan of care  [x]  Update interventions per flow sheet       []  Discharge due to:_  []  Other:_      Cain Roberson, PT 10/11/2022

## 2022-10-13 ENCOUNTER — HOSPITAL ENCOUNTER (OUTPATIENT)
Dept: PHYSICAL THERAPY | Age: 82
Discharge: HOME | End: 2022-10-13
Payer: MEDICARE

## 2022-10-13 PROCEDURE — 97110 THERAPEUTIC EXERCISES: CPT

## 2022-10-13 PROCEDURE — 97112 NEUROMUSCULAR REEDUCATION: CPT

## 2022-10-13 NOTE — PROGRESS NOTES
PT DAILY TREATMENT NOTE - [facility] 2-15    Patient Name: Ken Elena  Date:10/13/2022  : 1940  [x]  Patient  Verified  Payor: Lucy Alcantara / Plan: [place] MED[place] & [place] / Product Type: Medicare /    In time: 1053pm  Out time: 1152pm  Total Treatment Time (min): 59  Total Timed Codes (min): 59  1:1 Treatment Time ( [address] only): 59   Visit #:  3    Treatment Area: Other abnormalities of gait and mobility [R26.89]    SUBJECTIVE  Pain Level (0-10 scale): 0  Any medication changes, allergies to medications, adverse drug reactions, diagnosis change, or new procedure performed?: [x] No    [] Yes (see summary sheet for update)  Subjective functional status/changes:   [] No changes reported  The patient reports that they felt pretty good following previous treatment session, didn't have too much soreness following but were a bit tired. Noted they really like doing their bridges and continues to perform exercises at home. Noted the EC tandem stance was probably their most difficult exercise. OBJECTIVE    47 min Therapeutic Exercise:  [x] See flow sheet :   Rationale: increase ROM, increase strength, and improve coordination to improve the patients ability to perform daily activities. 12 min Neuromuscular Re-education:  [x]  See flow sheet :   Rationale: improve coordination, improve balance, and increase proprioception  to improve the patients ability to perform daily activities. With   [x] TE   [] TA   [] Neuro   [] SC   [] other: Patient Education: [x] Review HEP    [x] Progressed/Changed HEP based on:   [x] positioning   [x] body mechanics   [] transfers   [] heat/ice application    [] other:      Other Objective/Functional Measures: none noted     Pain Level (0-10 scale) post treatment: 0    ASSESSMENT/Changes in Function:   Patient tolerated treatment session well today, able to perform progressions and new exercises without increasing symptoms.  Patient required verbal cuing to control eccentric portion of TR/STS, able to improve following verbal cuing. Patient noted some instances of LOB during EC tandem stance, able to self correct using UE assist.   Patient will continue to benefit from skilled PT services to modify and progress therapeutic interventions, address functional mobility deficits, address ROM deficits, address strength deficits, analyze and address soft tissue restrictions, analyze and cue movement patterns, analyze and modify body mechanics/ergonomics, assess and modify postural abnormalities, address imbalance/dizziness, and instruct in home and community integration to attain remaining goals. [x]  See Plan of Care  []  See progress note/recertification  []  See Discharge Summary         Progress towards goals / Updated goals: The patient is progressing towards goals.      PLAN  [x]  Upgrade activities as tolerated     [x]  Continue plan of care  [x]  Update interventions per flow sheet       []  Discharge due to:_  []  Other:_      Marifer Rosas, PTA 10/13/2022

## 2022-10-18 ENCOUNTER — HOSPITAL ENCOUNTER (OUTPATIENT)
Dept: PHYSICAL THERAPY | Age: 82
Discharge: HOME | End: 2022-10-18
Payer: MEDICARE

## 2022-10-18 PROCEDURE — 97110 THERAPEUTIC EXERCISES: CPT | Performed by: PHYSICAL THERAPIST

## 2022-10-18 PROCEDURE — 97112 NEUROMUSCULAR REEDUCATION: CPT | Performed by: PHYSICAL THERAPIST

## 2022-10-18 NOTE — PROGRESS NOTES
PT DAILY TREATMENT NOTE - [facility] 2-15    Patient Name: Bryan Buckley  Date:10/18/2022  : 1940  [x]  Patient  Verified  Payor: Wilder Pearson / Plan: [place] MEDICARE PART A & B / Product Type: Medicare /    In time: 12:00pm  Out time: 12:55pm  Total Treatment Time (min): 55  Total Timed Codes (min): 55  1:1 Treatment Time ( [address] only): 55   Visit #:  4    Treatment Area: Other abnormalities of gait and mobility [R26.89]    SUBJECTIVE  Pain Level (0-10 scale): 0  Any medication changes, allergies to medications, adverse drug reactions, diagnosis change, or new procedure performed?: [x] No    [] Yes (see summary sheet for update)  Subjective functional status/changes:   [] No changes reported  The patient reports that the exercises are going well. OBJECTIVE    30 min Therapeutic Exercise:  [x] See flow sheet :   Rationale: increase ROM, increase strength, and improve coordination to improve the patients ability to perform daily activities. 25 min Neuromuscular Re-education:  [x]  See flow sheet :   Rationale: improve coordination, improve balance, and increase proprioception  to improve the patients ability to perform daily activities. With   [x] TE   [] TA   [] Neuro   [] SC   [] other: Patient Education: [x] Review HEP    [x] Progressed/Changed HEP based on:   [x] positioning   [x] body mechanics   [] transfers   [] heat/ice application    [] other:      Other Objective/Functional Measures: none noted     Pain Level (0-10 scale) post treatment: 0    ASSESSMENT/Changes in Function:   The patient progressed with stability exercises, but had difficulty with side stepovers on the mat over hurdles and had poor foot placements.   Patient will continue to benefit from skilled PT services to modify and progress therapeutic interventions, address functional mobility deficits, address ROM deficits, address strength deficits, analyze and address soft tissue restrictions, analyze and cue movement patterns, analyze and modify body mechanics/ergonomics, assess and modify postural abnormalities, address imbalance/dizziness, and instruct in home and community integration to attain remaining goals. [x]  See Plan of Care  []  See progress note/recertification  []  See Discharge Summary         Progress towards goals / Updated goals: The patient is progressing towards goals.      PLAN  [x]  Upgrade activities as tolerated     [x]  Continue plan of care  [x]  Update interventions per flow sheet       []  Discharge due to:_  []  Other:_      Karlene Ayers, PT 10/18/2022

## 2022-10-20 ENCOUNTER — HOSPITAL ENCOUNTER (OUTPATIENT)
Dept: PHYSICAL THERAPY | Age: 82
Discharge: HOME | End: 2022-10-20
Payer: MEDICARE

## 2022-10-20 PROCEDURE — 97112 NEUROMUSCULAR REEDUCATION: CPT | Performed by: PHYSICAL THERAPIST

## 2022-10-20 NOTE — PROGRESS NOTES
PT DAILY TREATMENT NOTE - [facility] 2-15    Patient Name: Richard Wahl  Date:10/20/2022  : 1940  [x]  Patient  Verified  Payor: Lopez Camarena / Plan: [place] MEDICARE PART A & B / Product Type: Medicare /    In time: 9:03am Out time: 9:48am  Total Treatment Time (min): 45  Total Timed Codes (min): 45  1:1 Treatment Time ( [address] only): 28   Visit #:  5    Treatment Area: Other abnormalities of gait and mobility [R26.89]    SUBJECTIVE  Pain Level (0-10 scale): 0  Any medication changes, allergies to medications, adverse drug reactions, diagnosis change, or new procedure performed?: [x] No    [] Yes (see summary sheet for update)  Subjective functional status/changes:   [] No changes reported  The patient reports that she was just a little sore after last time, but felt good. She really wants to be allowed to drive again. OBJECTIVE    17 min Therapeutic Exercise:  [x] See flow sheet :   Rationale: increase ROM, increase strength, and improve coordination to improve the patients ability to perform daily activities. 28 min Neuromuscular Re-education:  [x]  See flow sheet :   Rationale: improve coordination, improve balance, and increase proprioception  to improve the patients ability to perform daily activities. With   [x] TE   [] TA   [] Neuro   [] SC   [] other: Patient Education: [x] Review HEP    [x] Progressed/Changed HEP based on:   [x] positioning   [x] body mechanics   [] transfers   [] heat/ice application    [] other:      Other Objective/Functional Measures: none noted     Pain Level (0-10 scale) post treatment: 0    ASSESSMENT/Changes in Function:   The patient progressed with improved stability and coordination.   Patient will continue to benefit from skilled PT services to modify and progress therapeutic interventions, address functional mobility deficits, address ROM deficits, address strength deficits, analyze and address soft tissue restrictions, analyze and cue movement patterns, analyze and modify body mechanics/ergonomics, assess and modify postural abnormalities, address imbalance/dizziness, and instruct in home and community integration to attain remaining goals. [x]  See Plan of Care  []  See progress note/recertification  []  See Discharge Summary         Progress towards goals / Updated goals: The patient is progressing towards goals.      PLAN  [x]  Upgrade activities as tolerated     [x]  Continue plan of care  [x]  Update interventions per flow sheet       []  Discharge due to:_  []  Other:_      Kaylah Chapa, PT 10/20/2022

## 2022-10-25 ENCOUNTER — HOSPITAL ENCOUNTER (OUTPATIENT)
Dept: PHYSICAL THERAPY | Age: 82
Discharge: HOME | End: 2022-10-25
Payer: MEDICARE

## 2022-10-25 PROCEDURE — 97112 NEUROMUSCULAR REEDUCATION: CPT | Performed by: PHYSICAL THERAPIST

## 2022-10-25 PROCEDURE — 97110 THERAPEUTIC EXERCISES: CPT | Performed by: PHYSICAL THERAPIST

## 2022-10-25 NOTE — PROGRESS NOTES
PT DAILY TREATMENT NOTE - [facility] 2-15    Patient Name: Yonny Vasquez  Date:10/25/2022  : 1940  [x]  Patient  Verified  Payor: Glynn Go / Plan: [place] MEDICARE PART A & B / Product Type: Medicare /    In time: 9:30am Out time: 10:25am  Total Treatment Time (min): 55  Total Timed Codes (min): 55  1:1 Treatment Time ( [address] only): 38  Visit #:  6    Treatment Area: Other abnormalities of gait and mobility [R26.89]    SUBJECTIVE  Pain Level (0-10 scale): 0  Any medication changes, allergies to medications, adverse drug reactions, diagnosis change, or new procedure performed?: [x] No    [] Yes (see summary sheet for update)  Subjective functional status/changes:   [] No changes reported  The patient reports that she feels like she's getting better. OBJECTIVE    30 min Therapeutic Exercise:  [x] See flow sheet :   Rationale: increase ROM, increase strength, and improve coordination to improve the patients ability to perform daily activities. 25 min Neuromuscular Re-education:  [x]  See flow sheet :   Rationale: improve coordination, improve balance, and increase proprioception  to improve the patients ability to perform daily activities. With   [x] TE   [] TA   [] Neuro   [] SC   [] other: Patient Education: [x] Review HEP    [x] Progressed/Changed HEP based on:   [x] positioning   [x] body mechanics   [] transfers   [] heat/ice application    [] other:      Other Objective/Functional Measures: none noted     Pain Level (0-10 scale) post treatment: 0    ASSESSMENT/Changes in Function:   The patient progressed with improved stability and strengthening as tolerated and will be reassessed next visit.   Patient will continue to benefit from skilled PT services to modify and progress therapeutic interventions, address functional mobility deficits, address ROM deficits, address strength deficits, analyze and address soft tissue restrictions, analyze and cue movement patterns, analyze and modify body mechanics/ergonomics, assess and modify postural abnormalities, address imbalance/dizziness, and instruct in home and community integration to attain remaining goals. [x]  See Plan of Care  []  See progress note/recertification  []  See Discharge Summary         Progress towards goals / Updated goals: The patient is progressing towards goals.      PLAN  [x]  Upgrade activities as tolerated     [x]  Continue plan of care  [x]  Update interventions per flow sheet       []  Discharge due to:_  []  Other:_      Kaylah Chapa, PT 10/25/2022

## 2022-10-27 ENCOUNTER — HOSPITAL ENCOUNTER (OUTPATIENT)
Dept: PHYSICAL THERAPY | Age: 82
Discharge: HOME | End: 2022-10-27
Payer: MEDICARE

## 2022-10-27 ENCOUNTER — OFFICE VISIT (OUTPATIENT)
Dept: NEUROLOGY | Age: 82
End: 2022-10-27
Payer: MEDICARE

## 2022-10-27 VITALS
TEMPERATURE: 97.8 F | HEIGHT: 64 IN | WEIGHT: 106 LBS | BODY MASS INDEX: 18.1 KG/M2 | OXYGEN SATURATION: 100 % | SYSTOLIC BLOOD PRESSURE: 132 MMHG | DIASTOLIC BLOOD PRESSURE: 80 MMHG | RESPIRATION RATE: 18 BRPM | HEART RATE: 70 BPM

## 2022-10-27 DIAGNOSIS — I10 PRIMARY HYPERTENSION: ICD-10-CM

## 2022-10-27 DIAGNOSIS — I63.9 CEREBROVASCULAR ACCIDENT (CVA), UNSPECIFIED MECHANISM (HCC): Primary | ICD-10-CM

## 2022-10-27 PROCEDURE — 3074F SYST BP LT 130 MM HG: CPT | Performed by: INTERNAL MEDICINE

## 2022-10-27 PROCEDURE — 3078F DIAST BP <80 MM HG: CPT | Performed by: INTERNAL MEDICINE

## 2022-10-27 PROCEDURE — 1123F ACP DISCUSS/DSCN MKR DOCD: CPT | Performed by: INTERNAL MEDICINE

## 2022-10-27 PROCEDURE — 99214 OFFICE O/P EST MOD 30 MIN: CPT | Performed by: INTERNAL MEDICINE

## 2022-10-27 PROCEDURE — 97112 NEUROMUSCULAR REEDUCATION: CPT | Performed by: PHYSICAL THERAPIST

## 2022-10-27 PROCEDURE — 97110 THERAPEUTIC EXERCISES: CPT | Performed by: PHYSICAL THERAPIST

## 2022-10-27 NOTE — PROGRESS NOTES
Chief Complaint   Patient presents with    New Patient     [address] 9/26-9/1-27 for Small acute ischemic right cerebellum stroke- doing well - no lingering sx         1. Have you been to the ER, urgent care clinic since your last visit? Hospitalized since your last visit? No     2. Have you seen or consulted any other health care providers outside of the [address] [place] [address] since your last visit? Include any pap smears or colon screening.  No

## 2022-10-27 NOTE — PROGRESS NOTES
[facility] [address]. Physical Therapy  [address] [place]  Phone: [phone] Fax: [phone]    Discharge Summary 2-15    Patient name: [facility]  : 1940  Provider#: [id number]  Referral source: Kathy Yanez DO      Medical/Treatment Diagnosis: Other abnormalities of gait and mobility [R26.89]     Prior Hospitalization: see medical history     Comorbidities: See Plan of Care  Prior Level of Function: See Plan of Care  Medications: Verified on Patient Summary List    Start of Care: 10/6/22      Onset Date: 22   Visits from Start of Care: 7     Missed Visits: 0  Reporting Period : 10/6/22 to 10/27/22    Assessment/Summary of care: Therapy has included therex and neuromuscular re-education exercises to improve strength and coordination s/p CVA on 22. The patient has progressed with the following objective measures for functional strength and stability:      Today vs. (At eval):  30s STS: 23x (21x)  5x STS: 7s (7s)  SLS: R= 4s (2s); L= 6s (4s)  Tandem: R (fwd)= 22s (12s); L (fwd)= 35s (30s)  TUs (8s)  MCTSIB: 1= 30s (30s); 2= 30s (7s LOB to the right); 4= 30s (30s); 5= 23s LOB (8s significant lean; 18s LOB)  2MWT: 6.75laps (6.25 laps)     The patient has improved her overall hip strength, stability, and coordination, though she was fairly close to baseline to start. She is very independent and lives alone and was driving short distances prior to the event on 22. She wants to be allowed to get back to driving again. +++This clinician sees no reason she should not be able to drive from a physical standpoint, but it was discussed that she needs to talk to her PCP about it, which she understood. +++    She has a safe and advanced HEP that she will continue to utilize to maintain improvements made thus far independently. Short Term Goals:  To be accomplished in 2 treatments: 1.) The patient will be independent with their HEP consistently for at least one week. - MET  Long Term Goals: To be accomplished in 24 treatments:                         1.) The patient will improver her single leg stance (SLS) to at least 4s on the right and 6s on the left to show improvement in stability.- MET                         2.) The patient will self report no falls or issues with daily activities for at least 2 weeks. - MET                         3.) The patient will improve their FOTO score from 62 to at least 65 to show improvements in functional mobility.- Not MET (64 today)                          4.) The patient will improve her MCTSIB score from 75/120 to at least 80/120 to show improvement in vestibular input.- MET (113/120 today)        RECOMMENDATIONS:  [x]Discontinue therapy: [x]Patient has reached or is progressing toward set goals     []Patient is non-compliant or has abdicated     []Due to lack of appreciable progress towards set goals     []Other  Pretty Simons, PT 10/27/2022

## 2022-10-27 NOTE — PROGRESS NOTES
Neurology Note    Patient ID:  Bryan Buckley  [id number]  78 y.o.  1940      Date of Consultation:  October 27, 2022        Assessment and Plan:  77 yo F with right acute ischemic cerebellar stroke presenting to the clinic for hospital follow-up. Patient symptoms improving. On aspirin, completed Plavix for 21 days. On atorvastatin 40 mg daily. HgbA1c  5.7  TTE no PFO. Cardiac event monitor pending results. Etiology of stroke either cardioembolic (pending event monitor results) vs small vessel disease. Completed PT. Back to baseline prior to stroke. Follow-up in 6 months. History of Present Illness: Bryan Buckley is a 78 y.o. female who presents for stroke follow-up from hospital.     Please see prior neurology consultation note:   \"Belen Patel is a 78 y.o. female with no prior medical history who is currently admitted to the hospital after she developed acute onset nausea dizziness and vomiting on the day of arrival.  Patient reports that she was at her baseline state of health when she suddenly felt a wave of nausea that was very severe, over her. She then tried to stand up became very dizzy and unsteady. She also was diaphoretic and had a few episodes of vomiting. She attempted to walk however could not get very far without holding onto furniture. Her daughter-in-law came to check on her and found her on the floor diaphoretic and pale in the face. For this reason EMS was called and patient was brought to the hospital for further evaluation. Here she underwent work-up for possible stroke including a noncontrast head CT that showed no acute abnormalities and a CTA head and neck that showed no large vessel occlusion. She was admitted to the hospital for further evaluation. She did have an MRI of the brain that showed a small area of stroke. Today on my evaluation patient reports that she is doing well and feels that her symptoms have improved.   She has been able to walk to the bathroom however still felt slightly unsteady. Medical history  None as she has not been to the physician in more than 10 years \"    Interval history:   Patient presents today with her son. She is significantly better, feels back to baseline. Still does not have a PCP. Has had no further neurological symptoms including slurred speech, language difficulties, paresthesias or weakness. Denies headaches. She completed a month of PT. She has completed her cardiac event monitor. Pending results. Continues on ASA 81 mg daily and Atorvastatin 40 mg daily. History reviewed. No pertinent past medical history. No past surgical history on file. History reviewed. No pertinent family history. Social History     Tobacco Use    Smoking status: Former     Types: Cigarettes     Quit date: 1976     Years since quittin.8    Smokeless tobacco: Never   Substance Use Topics    Alcohol use: Yes     Comment: ocassional glass of wine - very rare        No Known Allergies     Prior to Admission medications    Medication Sig Start Date End Date Taking? Authorizing Provider   aspirin 81 mg chewable tablet Take 1 Tablet by mouth daily. 22  Yes Derek Barraza MD   atorvastatin (LIPITOR) 40 mg tablet Take 1 Tablet by mouth nightly. 22  Yes Derek Barraza MD   famotidine (PEPCID) 20 mg tablet Take 1 Tablet by mouth two (2) times a day.   Patient not taking: Reported on 10/27/2022 9/27/22   Derek Barraza MD       Review of Systems:    General, constitutional: negative  Eyes, vision: negative  Ears, nose, throat: negative  Cardiovascular, heart: negative  Respiratory: negative  Gastrointestinal: negative  Genitourinary: negative  Musculoskeletal: negative  Skin and integumentary: negative  Psychiatric: negative  Endocrine: negative  Neurological: negative, except for HPI  Hematologic/lymphatic: negative  Allergy/immunology: negative    []Unable to obtain  ROS due to  []mental status change []sedated   []intubated    Objective:     Visit Vitals  /80 (BP 1 Location: Left upper arm, BP Patient Position: Sitting, BP Cuff Size: Small adult)   Pulse 70   Temp 97.8 °F (36.6 °C) (Temporal)   Resp 18   Ht 5' 4\" (1.626 m)   Wt 106 lb (48.1 kg)   SpO2 100%   BMI 18.19 kg/m²       Physical Exam:      General:  appears well nourished in no acute distress  Neck: no obvious deformity or masses  Lungs: comfortable on room air  Heart:  well-perfused   Lower extremity: no edema  Skin: intact    Neurological exam:  Awake, alert, oriented to person, place and time  Recent and remote memory were normal  Attention and concentration were intact  Language was intact. There was no aphasia  Speech: no dysarthria  Fund of knowledge was preserved    Cranial nerves:   II-XII were tested    PERRRLA  Visual fields were full to finger counting   EOMI, no evidence of nystagmus  Facial sensation:  normal and symmetric  Facial motor: normal and symmetric  Hearing intact  SCM strength intact  Tongue: midline without fasciculations    Motor: Tone normal in upper and lower extremities     Strength testing:   deltoid triceps biceps Wrist ext. Wrist flex. intrinsics Hip flex. Hip ext. Knee ext. Knee flex Dorsi flex Plantar flex   Right 5 5 5 5 5 5 5 5 5 5 5 5   Left 5 5 5 5 5 5 5 5 5 5 5 5       Sensory: intact to LT     Reflexes:    Right Left  Biceps  2 2  Triceps  2 2  Brachiorad. 2 2  Patella  2 2  Achilles 2 2    Cerebellar testing:  no tremor apparent, finger/nose and rapid alternating movements were intact      Gait: steady.  Heel, toe, and tandem gait were normal    Labs:     Lab Results   Component Value Date/Time    Hemoglobin A1c 5.7 (H) 09/27/2022 01:35 AM    Sodium 136 09/26/2022 11:20 AM    Potassium 3.9 09/26/2022 11:20 AM    Chloride 103 09/26/2022 11:20 AM    Glucose 193 (H) 09/26/2022 11:20 AM    BUN 16 09/26/2022 11:20 AM    Creatinine 0.83 09/26/2022 11:20 AM    Calcium 10.0 09/26/2022 11:20 AM    WBC 7.2 09/26/2022 11:20 AM    HCT 36.7 09/26/2022 11:20 AM    HGB 12.5 09/26/2022 11:20 AM    PLATELET 158 83/04/1676 11:20 AM       Imaging:    Results from Hospital Encounter encounter on 09/26/22    MRI BRAIN W WO CONT    Narrative  EXAM:  MRI BRAIN W WO CONT    INDICATION:    Severe vertigo, CT calcification cerebellum    COMPARISON:  None. CONTRAST: 10 cc IV ProHance    TECHNIQUE:  Multiplanar multisequence acquisition without and with contrast of the brain. FINDINGS:  Diffusion imaging shows a focal small area of diffusion restriction in the  inferior medial aspect of the right cerebellar peduncle. No associated  hemorrhage or mass effect. There is no extra-axial fluid collection, hemorrhage or shift. Flow voids in major vessels at the base of the brain are present. There is a moderate amount of nonspecific white matter changes. Ventricular size normal for age. The inferior left cerebellar calcifications show no associated enhancement and  may be related to remote trauma hemorrhage or trauma. There is no enhancing lesion or masses her. Impression  1. Small area of acute ischemia right cerebellum as above. 2. No masses. 3. Nonspecific left cerebellar calcification likely chronic and related to a  prior hemorrhage or trauma. 4. Moderate amount of nonspecific white matter changes. Results from Hospital Encounter encounter on 09/26/22    CT ABD PELV W CONT    Narrative  EXAM: CT ABD PELV W CONT    INDICATION: Severe nausea and vomiting    COMPARISON: No comparison    CONTRAST: 100 mL of Isovue-370. TECHNIQUE:  Following the uneventful intravenous administration of contrast, thin axial  images were obtained through the abdomen and pelvis. Coronal and sagittal  reconstructions were generated. Oral contrast was not administered.  CT dose  reduction was achieved through use of a standardized protocol tailored for this  examination and automatic exposure control for dose modulation. FINDINGS:  LOWER THORAX: No significant abnormality in the incidentally imaged lower chest.  LIVER: No mass. BILIARY TREE: Gallbladder is within normal limits. CBD is not dilated. SPLEEN: within normal limits. PANCREAS: No mass or ductal dilatation. ADRENALS: Unremarkable. KIDNEYS: No mass, calculus, or hydronephrosis. Small hypodensity left kidney too  small to characterize. STOMACH: Unremarkable. SMALL BOWEL: No dilatation or wall thickening. COLON: Questionable mild wall thickening without dilatation involving the  ascending and transverse colon. . Large amount stool in the rectum. APPENDIX: The appendix is not well visualized. There is possible small  hypodensities versus appendiceal stump with an appendicolith versus a clip. PERITONEUM: No ascites or pneumoperitoneum. RETROPERITONEUM: No lymphadenopathy or aortic aneurysm. REPRODUCTIVE ORGANS: Unremarkable  URINARY BLADDER: Distended urinary bladder. BONES: No destructive bone lesion. 3 mm anterolisthesis of L3 and L4. Degenerative changes with canal stenosis foraminal narrowing the lower lumbar  spine. ABDOMINAL WALL: No mass or hernia. ADDITIONAL COMMENTS: N/A    Impression  1. Question low-grade colitis involving the ascending and transverse colon. 2.  Large amount stool in the rectum. 3.  Distended urinary bladder.     4.  Other incidental findings as above             Patient Active Problem List   Diagnosis Code   (none) - all problems resolved or deleted                   Signed By:   Sheri Dudley DO  Neurophysiology      October 27, 2022

## 2022-10-27 NOTE — PROGRESS NOTES
PT DAILY TREATMENT NOTE - [facility] 2-15    Patient Name: Ramirez Cunha  Date:10/27/2022  : 1940  [x]  Patient  Verified  Payor: Janene  / Plan: [place] MEDICARE PART A & B / Product Type: Medicare /    In time: 9:33am Out time: 10:10am  Total Treatment Time (min): 37  Total Timed Codes (min): 37  1:1 Treatment Time ( [address] only): 23  Visit #:  7    Treatment Area: Other abnormalities of gait and mobility [R26.89]    SUBJECTIVE  Pain Level (0-10 scale): 0  Any medication changes, allergies to medications, adverse drug reactions, diagnosis change, or new procedure performed?: [x] No    [] Yes (see summary sheet for update)  Subjective functional status/changes:   [] No changes reported  The patient reports that she's doing great. OBJECTIVE    22 min Therapeutic Exercise:  [x] See flow sheet :   Rationale: increase ROM, increase strength, and improve coordination to improve the patients ability to perform daily activities. 15 min Neuromuscular Re-education:  [x]  See flow sheet :   Rationale: improve coordination, improve balance, and increase proprioception  to improve the patients ability to perform daily activities. With   [x] TE   [] TA   [] Neuro   [] SC   [] other: Patient Education: [x] Review HEP    [x] Progressed/Changed HEP based on:   [x] positioning   [x] body mechanics   [] transfers   [] heat/ice application    [] other:      Other Objective/Functional Measures: FOTO= 64 (62 at eval)     Pain Level (0-10 scale) post treatment: 0    ASSESSMENT/Changes in Function:   The patient has progressed very well with strengthening and stability and will be discharged to her HEP. []  See Plan of Care  []  See progress note/recertification  [x]  See Discharge Summary         Progress towards goals / Updated goals: The patient has progressed and MET most goals.      PLAN  []  Upgrade activities as tolerated     []  Continue plan of care  []  Update interventions per flow sheet       [x] Discharge due to: Pt. Has MET goals.    []  Other:_      Lolita Jarquin, PT 10/27/2022

## 2022-11-07 RX ORDER — ATORVASTATIN CALCIUM 40 MG/1
40 TABLET, FILM COATED ORAL
Qty: 30 TABLET | Refills: 0 | Status: SHIPPED | OUTPATIENT
Start: 2022-11-07

## 2022-11-07 NOTE — TELEPHONE ENCOUNTER
Pt's daughter called for refill of:    Requested Prescriptions     Pending Prescriptions Disp Refills    atorvastatin (LIPITOR) 40 mg tablet 30 Tablet 0     Sig: Take 1 Tablet by mouth nightly. [facility].

## 2022-12-06 ENCOUNTER — TELEPHONE (OUTPATIENT)
Dept: NEUROLOGY | Age: 82
End: 2022-12-06

## 2022-12-09 NOTE — TELEPHONE ENCOUNTER
Voicemail:    Patient's daughter, Jyoti Bruno, called stating that they are still waiting for Pt's Lipitor to be refilled. Jyoti Bruno wants to know when this will be completed.  Please call [phone]

## 2022-12-12 RX ORDER — ATORVASTATIN CALCIUM 40 MG/1
40 TABLET, FILM COATED ORAL
Qty: 30 TABLET | Refills: 5 | Status: SHIPPED | OUTPATIENT
Start: 2022-12-12

## 2023-04-17 DIAGNOSIS — I35.1 AORTIC VALVE INSUFFICIENCY, ETIOLOGY OF CARDIAC VALVE DISEASE UNSPECIFIED: ICD-10-CM

## 2023-04-17 DIAGNOSIS — E78.00 HIGH CHOLESTEROL: ICD-10-CM

## 2023-04-17 DIAGNOSIS — I10 ESSENTIAL HYPERTENSION: ICD-10-CM

## 2023-04-17 RX ORDER — ATORVASTATIN CALCIUM 40 MG/1
40 TABLET, FILM COATED ORAL
Qty: 90 TABLET | Refills: 1 | Status: SHIPPED | OUTPATIENT
Start: 2023-04-17

## 2023-04-17 RX ORDER — LOSARTAN POTASSIUM 25 MG/1
25 TABLET ORAL DAILY
Qty: 90 TABLET | Refills: 1 | Status: SHIPPED | OUTPATIENT
Start: 2023-04-17

## 2023-04-17 NOTE — TELEPHONE ENCOUNTER
----- Message from Carmencita Richard. Romeo France sent at 4/17/2023 12:57 PM EDT -----  Regarding: Could you please confirm my bp prescription? Contact: [phone]  Could you please fill the prescription for the Losartan at [place], [address] [facility], [address], [facility] [facility], [phone]? It appears to offer the best savings. I have been getting the Lipitor at [facility], [address] [place] [address], [address] [place] [address], [phone] and would prefer to renew the prescription there, if possible. Thank you for you all your assistance. I appreciate your prompt response. Thank you.

## 2023-04-17 NOTE — TELEPHONE ENCOUNTER
Future Appointments:  Future Appointments   Date Time Provider Brunilda Mejia   4/27/2023 10:00 AM ABDOULAYE DENNIS BEH [facility] NURSE [facility] BS AMB   6/1/2023 11:00 AM Paulina Chowdhury DO NEUM BS AMB   10/17/2023  9:00 AM Amber Rothman MD [facility] BS AMB        Last Appointment With Me:  Visit date not found     Requested Prescriptions     Pending Prescriptions Disp Refills    losartan (COZAAR) 25 mg tablet 90 Tablet 1     Sig: Take 1 Tablet by mouth daily. atorvastatin (LIPITOR) 40 mg tablet 90 Tablet 1     Sig: Take 1 Tablet by mouth nightly.

## 2023-10-17 ENCOUNTER — OFFICE VISIT (OUTPATIENT)
Age: 83
End: 2023-10-17
Payer: MEDICARE

## 2023-10-17 VITALS
HEIGHT: 64 IN | DIASTOLIC BLOOD PRESSURE: 64 MMHG | BODY MASS INDEX: 18.64 KG/M2 | SYSTOLIC BLOOD PRESSURE: 173 MMHG | OXYGEN SATURATION: 99 % | RESPIRATION RATE: 18 BRPM | TEMPERATURE: 98 F | WEIGHT: 109.2 LBS | HEART RATE: 70 BPM

## 2023-10-17 DIAGNOSIS — I48.0 PAROXYSMAL ATRIAL FIBRILLATION (HCC): ICD-10-CM

## 2023-10-17 DIAGNOSIS — Z86.73 HISTORY OF ISCHEMIC VERTEBROBASILAR ARTERY CEREBELLAR STROKE: ICD-10-CM

## 2023-10-17 DIAGNOSIS — I35.1 NONRHEUMATIC AORTIC VALVE INSUFFICIENCY: ICD-10-CM

## 2023-10-17 DIAGNOSIS — I10 PRIMARY HYPERTENSION: ICD-10-CM

## 2023-10-17 DIAGNOSIS — E78.00 HIGH CHOLESTEROL: ICD-10-CM

## 2023-10-17 DIAGNOSIS — Z00.00 MEDICARE ANNUAL WELLNESS VISIT, SUBSEQUENT: Primary | ICD-10-CM

## 2023-10-17 PROCEDURE — 3078F DIAST BP <80 MM HG: CPT | Performed by: INTERNAL MEDICINE

## 2023-10-17 PROCEDURE — G8484 FLU IMMUNIZE NO ADMIN: HCPCS | Performed by: INTERNAL MEDICINE

## 2023-10-17 PROCEDURE — G0439 PPPS, SUBSEQ VISIT: HCPCS | Performed by: INTERNAL MEDICINE

## 2023-10-17 PROCEDURE — 3077F SYST BP >= 140 MM HG: CPT | Performed by: INTERNAL MEDICINE

## 2023-10-17 PROCEDURE — 1123F ACP DISCUSS/DSCN MKR DOCD: CPT | Performed by: INTERNAL MEDICINE

## 2023-10-17 RX ORDER — ATORVASTATIN CALCIUM 40 MG/1
40 TABLET, FILM COATED ORAL DAILY
Qty: 90 TABLET | Refills: 1 | Status: SHIPPED | OUTPATIENT
Start: 2023-10-17

## 2023-10-17 RX ORDER — LOSARTAN POTASSIUM 25 MG/1
25 TABLET ORAL DAILY
Qty: 90 TABLET | Refills: 1 | Status: SHIPPED | OUTPATIENT
Start: 2023-10-17

## 2023-10-17 ASSESSMENT — PATIENT HEALTH QUESTIONNAIRE - PHQ9
2. FEELING DOWN, DEPRESSED OR HOPELESS: 0
SUM OF ALL RESPONSES TO PHQ9 QUESTIONS 1 & 2: 0
SUM OF ALL RESPONSES TO PHQ QUESTIONS 1-9: 0
1. LITTLE INTEREST OR PLEASURE IN DOING THINGS: 0
SUM OF ALL RESPONSES TO PHQ QUESTIONS 1-9: 0

## 2023-10-17 ASSESSMENT — LIFESTYLE VARIABLES
HOW OFTEN DO YOU HAVE A DRINK CONTAINING ALCOHOL: NEVER
HOW MANY STANDARD DRINKS CONTAINING ALCOHOL DO YOU HAVE ON A TYPICAL DAY: PATIENT DOES NOT DRINK

## 2023-10-17 NOTE — PROGRESS NOTES
1. \"Have you been to the ER, urgent care clinic since your last visit? Hospitalized since your last visit? \" No    2. \"Have you seen or consulted any other health care providers outside of the [address] [place] [address] since your last visit? \" No     3. For patients aged 43-73: Has the patient had a colonoscopy / FIT/ Cologuard? NA - based on age      If the patient is female:    4. For patients aged 43-66: Has the patient had a mammogram within the past 2 years? NA - based on age or sex      11. For patients aged 21-65: Has the patient had a pap smear?  NA - based on age or sex
respiratory distress  Cardiovascular: normal rate, regular rhythm, normal S1 and S2, no murmurs, rubs, clicks, or gallops, distal pulses intact, no carotid bruits  Abdomen: soft, non-tender, non-distended, normal bowel sounds, no masses or organomegaly  Extremities: no cyanosis, clubbing or edema  Musculoskeletal: normal range of motion, no joint swelling, deformity or tenderness  Neurologic: reflexes normal and symmetric, no cranial nerve deficit, gait, coordination and speech normal       No Known Allergies  Prior to Visit Medications    Medication Sig Taking?  Authorizing Provider   aspirin 81 MG chewable tablet Take by mouth daily Yes Automatic Reconciliation, Ar   atorvastatin (LIPITOR) 40 MG tablet Take by mouth Yes Automatic Reconciliation, Ar   famotidine (PEPCID) 20 MG tablet Take by mouth 2 times daily  Patient not taking: Reported on 10/17/2023  Automatic Reconciliation, Ar       CareTeam (Including outside providers/suppliers regularly involved in providing care):   Patient Care Team:  Pilar Noble MD as PCP - Viri Andersen MD as PCP - Empaneled Provider     Reviewed and updated this visit:  Tobacco  Allergies  Meds  Med Hx  Surg Hx  Soc Hx  Fam Hx

## 2023-10-17 NOTE — PATIENT INSTRUCTIONS
Start losartan 25mg daily for blood pressure    Continue with aspirin and cholesterol pill    Schedule appointment with Dr Óscar Castillo cardiologist to ask his opinion on brief episode of atrial fibrillation seen on monitor if eliquis is indicated to prevent stroke    Advance Directives: Care Instructions  Overview  An advance directive is a legal way to state your wishes at the end of your life. It tells your family and your doctor what to do if you can't say what you want. There are two main types of advance directives. You can change them any time your wishes change. Living will. This form tells your family and your doctor your wishes about life support and other treatment. The form is also called a declaration. Medical power of . This form lets you name a person to make treatment decisions for you when you can't speak for yourself. This person is called a health care agent (health care proxy, health care surrogate). The form is also called a durable power of  for health care. If you do not have an advance directive, decisions about your medical care may be made by a family member, or by a doctor or a  who doesn't know you. It may help to think of an advance directive as a gift to the people who care for you. If you have one, they won't have to make tough decisions by themselves. For more information, including forms for your state, see the [address] [place] [address] website (www.caringinfo.org/planning/advance-directives/). Follow-up care is a key part of your treatment and safety. Be sure to make and go to all appointments, and call your doctor if you are having problems. It's also a good idea to know your test results and keep a list of the medicines you take. What should you include in an advance directive? Many states have a unique advance directive form. (It may ask you to address specific issues.) Or you might use a universal form that's approved by many states.   If your form doesn't tell

## 2024-04-01 DIAGNOSIS — I10 PRIMARY HYPERTENSION: ICD-10-CM

## 2024-04-01 RX ORDER — LOSARTAN POTASSIUM 25 MG/1
25 TABLET ORAL DAILY
Qty: 90 TABLET | Refills: 1 | Status: SHIPPED | OUTPATIENT
Start: 2024-04-01

## 2024-04-15 ENCOUNTER — TELEPHONE (OUTPATIENT)
Age: 84
End: 2024-04-15

## 2024-04-15 NOTE — TELEPHONE ENCOUNTER
----- Message from Lindsey Renee sent at 4/15/2024 11:38 AM EDT -----  Subject: Appointment Request    Reason for Call: Established Patient Appointment needed: Routine Existing   Condition Follow Up    QUESTIONS    Reason for appointment request? No appointments available during search     Additional Information for Provider? Patient needs to reschedule in person   follow up asap. Only VV available.   ---------------------------------------------------------------------------  --------------  CALL BACK INFO  [phone]; OK to leave message on voicemail  ---------------------------------------------------------------------------  --------------  SCRIPT ANSWERS

## 2024-05-02 ENCOUNTER — OFFICE VISIT (OUTPATIENT)
Age: 84
End: 2024-05-02
Payer: MEDICARE

## 2024-05-02 VITALS
RESPIRATION RATE: 20 BRPM | WEIGHT: 110.6 LBS | TEMPERATURE: 98.1 F | HEIGHT: 64 IN | OXYGEN SATURATION: 97 % | SYSTOLIC BLOOD PRESSURE: 175 MMHG | DIASTOLIC BLOOD PRESSURE: 75 MMHG | HEART RATE: 73 BPM | BODY MASS INDEX: 18.88 KG/M2

## 2024-05-02 DIAGNOSIS — I10 PRIMARY HYPERTENSION: ICD-10-CM

## 2024-05-02 DIAGNOSIS — E78.00 HIGH CHOLESTEROL: ICD-10-CM

## 2024-05-02 DIAGNOSIS — Z86.73 HISTORY OF ISCHEMIC VERTEBROBASILAR ARTERY CEREBELLAR STROKE: ICD-10-CM

## 2024-05-02 DIAGNOSIS — I10 PRIMARY HYPERTENSION: Primary | ICD-10-CM

## 2024-05-02 DIAGNOSIS — I48.0 PAROXYSMAL ATRIAL FIBRILLATION (HCC): ICD-10-CM

## 2024-05-02 PROCEDURE — 1090F PRES/ABSN URINE INCON ASSESS: CPT | Performed by: INTERNAL MEDICINE

## 2024-05-02 PROCEDURE — 3077F SYST BP >= 140 MM HG: CPT | Performed by: INTERNAL MEDICINE

## 2024-05-02 PROCEDURE — G8400 PT W/DXA NO RESULTS DOC: HCPCS | Performed by: INTERNAL MEDICINE

## 2024-05-02 PROCEDURE — 3078F DIAST BP <80 MM HG: CPT | Performed by: INTERNAL MEDICINE

## 2024-05-02 PROCEDURE — 1036F TOBACCO NON-USER: CPT | Performed by: INTERNAL MEDICINE

## 2024-05-02 PROCEDURE — G8427 DOCREV CUR MEDS BY ELIG CLIN: HCPCS | Performed by: INTERNAL MEDICINE

## 2024-05-02 PROCEDURE — 1123F ACP DISCUSS/DSCN MKR DOCD: CPT | Performed by: INTERNAL MEDICINE

## 2024-05-02 PROCEDURE — 99214 OFFICE O/P EST MOD 30 MIN: CPT | Performed by: INTERNAL MEDICINE

## 2024-05-02 PROCEDURE — G8420 CALC BMI NORM PARAMETERS: HCPCS | Performed by: INTERNAL MEDICINE

## 2024-05-02 RX ORDER — LOSARTAN POTASSIUM 50 MG/1
50 TABLET ORAL DAILY
Qty: 90 TABLET | Refills: 1 | Status: SHIPPED | OUTPATIENT
Start: 2024-05-02

## 2024-05-02 SDOH — ECONOMIC STABILITY: HOUSING INSECURITY
IN THE LAST 12 MONTHS, WAS THERE A TIME WHEN YOU DID NOT HAVE A STEADY PLACE TO SLEEP OR SLEPT IN A SHELTER (INCLUDING NOW)?: NO

## 2024-05-02 SDOH — ECONOMIC STABILITY: INCOME INSECURITY: HOW HARD IS IT FOR YOU TO PAY FOR THE VERY BASICS LIKE FOOD, HOUSING, MEDICAL CARE, AND HEATING?: NOT HARD AT ALL

## 2024-05-02 SDOH — ECONOMIC STABILITY: FOOD INSECURITY: WITHIN THE PAST 12 MONTHS, YOU WORRIED THAT YOUR FOOD WOULD RUN OUT BEFORE YOU GOT MONEY TO BUY MORE.: NEVER TRUE

## 2024-05-02 SDOH — ECONOMIC STABILITY: FOOD INSECURITY: WITHIN THE PAST 12 MONTHS, THE FOOD YOU BOUGHT JUST DIDN'T LAST AND YOU DIDN'T HAVE MONEY TO GET MORE.: NEVER TRUE

## 2024-05-02 ASSESSMENT — PATIENT HEALTH QUESTIONNAIRE - PHQ9
SUM OF ALL RESPONSES TO PHQ QUESTIONS 1-9: 0
1. LITTLE INTEREST OR PLEASURE IN DOING THINGS: NOT AT ALL
SUM OF ALL RESPONSES TO PHQ QUESTIONS 1-9: 0
2. FEELING DOWN, DEPRESSED OR HOPELESS: NOT AT ALL
SUM OF ALL RESPONSES TO PHQ QUESTIONS 1-9: 0
SUM OF ALL RESPONSES TO PHQ9 QUESTIONS 1 & 2: 0
SUM OF ALL RESPONSES TO PHQ QUESTIONS 1-9: 0

## 2024-05-02 NOTE — PROGRESS NOTES
\"Have you been to the ER, urgent care clinic since your last visit?  Hospitalized since your last visit?\"    NO    “Have you seen or consulted any other health care providers outside of [facility] since your last visit?”    NO            Click Here for Release of Records Request  
authenticate this note.    --Haley Franco MD

## 2024-05-03 LAB
ALBUMIN SERPL-MCNC: 3.9 G/DL (ref 3.5–5)
ALBUMIN/GLOB SERPL: 1.1 (ref 1.1–2.2)
ALP SERPL-CCNC: 103 U/L (ref 45–117)
ALT SERPL-CCNC: 26 U/L (ref 12–78)
ANION GAP SERPL CALC-SCNC: 3 MMOL/L (ref 5–15)
AST SERPL-CCNC: 41 U/L (ref 15–37)
BASOPHILS # BLD: 0 K/UL (ref 0–0.1)
BASOPHILS NFR BLD: 0 % (ref 0–1)
BILIRUB SERPL-MCNC: 0.6 MG/DL (ref 0.2–1)
BUN SERPL-MCNC: 17 MG/DL (ref 6–20)
BUN/CREAT SERPL: 21 (ref 12–20)
CALCIUM SERPL-MCNC: 10.6 MG/DL (ref 8.5–10.1)
CHLORIDE SERPL-SCNC: 104 MMOL/L (ref 97–108)
CHOLEST SERPL-MCNC: 154 MG/DL
CO2 SERPL-SCNC: 30 MMOL/L (ref 21–32)
CREAT SERPL-MCNC: 0.82 MG/DL (ref 0.55–1.02)
DIFFERENTIAL METHOD BLD: ABNORMAL
EOSINOPHIL # BLD: 0 K/UL (ref 0–0.4)
EOSINOPHIL NFR BLD: 1 % (ref 0–7)
ERYTHROCYTE [DISTWIDTH] IN BLOOD BY AUTOMATED COUNT: 12.4 % (ref 11.5–14.5)
GLOBULIN SER CALC-MCNC: 3.4 G/DL (ref 2–4)
GLUCOSE SERPL-MCNC: 87 MG/DL (ref 65–100)
HCT VFR BLD AUTO: 35.3 % (ref 35–47)
HDLC SERPL-MCNC: 68 MG/DL
HDLC SERPL: 2.3 (ref 0–5)
HGB BLD-MCNC: 11.7 G/DL (ref 11.5–16)
IMM GRANULOCYTES # BLD AUTO: 0 K/UL (ref 0–0.04)
IMM GRANULOCYTES NFR BLD AUTO: 0 % (ref 0–0.5)
LDLC SERPL CALC-MCNC: 76.6 MG/DL (ref 0–100)
LYMPHOCYTES # BLD: 1.7 K/UL (ref 0.8–3.5)
LYMPHOCYTES NFR BLD: 35 % (ref 12–49)
MCH RBC QN AUTO: 32.1 PG (ref 26–34)
MCHC RBC AUTO-ENTMCNC: 33.1 G/DL (ref 30–36.5)
MCV RBC AUTO: 97 FL (ref 80–99)
MONOCYTES # BLD: 0.5 K/UL (ref 0–1)
MONOCYTES NFR BLD: 9 % (ref 5–13)
NEUTS SEG # BLD: 2.7 K/UL (ref 1.8–8)
NEUTS SEG NFR BLD: 55 % (ref 32–75)
NRBC # BLD: 0 K/UL (ref 0–0.01)
NRBC BLD-RTO: 0 PER 100 WBC
PLATELET # BLD AUTO: 212 K/UL (ref 150–400)
PMV BLD AUTO: 10.7 FL (ref 8.9–12.9)
POTASSIUM SERPL-SCNC: 4.5 MMOL/L (ref 3.5–5.1)
PROT SERPL-MCNC: 7.3 G/DL (ref 6.4–8.2)
RBC # BLD AUTO: 3.64 M/UL (ref 3.8–5.2)
SODIUM SERPL-SCNC: 137 MMOL/L (ref 136–145)
TRIGL SERPL-MCNC: 47 MG/DL
VLDLC SERPL CALC-MCNC: 9.4 MG/DL
WBC # BLD AUTO: 4.9 K/UL (ref 3.6–11)

## 2024-05-20 DIAGNOSIS — E78.00 HIGH CHOLESTEROL: ICD-10-CM

## 2024-05-20 DIAGNOSIS — Z86.73 HISTORY OF ISCHEMIC VERTEBROBASILAR ARTERY CEREBELLAR STROKE: ICD-10-CM

## 2024-05-21 RX ORDER — ATORVASTATIN CALCIUM 40 MG/1
40 TABLET, FILM COATED ORAL NIGHTLY
Qty: 90 TABLET | Refills: 1 | OUTPATIENT
Start: 2024-05-21

## 2024-05-21 RX ORDER — ATORVASTATIN CALCIUM 40 MG/1
40 TABLET, FILM COATED ORAL DAILY
Qty: 90 TABLET | Refills: 1 | Status: SHIPPED | OUTPATIENT
Start: 2024-05-21

## 2024-10-25 ENCOUNTER — TELEPHONE (OUTPATIENT)
Age: 84
End: 2024-10-25

## 2024-10-25 DIAGNOSIS — I10 ESSENTIAL (PRIMARY) HYPERTENSION: Primary | ICD-10-CM

## 2024-10-28 ENCOUNTER — LAB (OUTPATIENT)
Age: 84
End: 2024-10-28

## 2024-10-28 DIAGNOSIS — I10 ESSENTIAL (PRIMARY) HYPERTENSION: ICD-10-CM

## 2024-10-28 LAB
ALBUMIN SERPL-MCNC: 4.4 G/DL (ref 3.5–5)
ALBUMIN/GLOB SERPL: 1.4 (ref 1.1–2.2)
ALP SERPL-CCNC: 98 U/L (ref 45–117)
ALT SERPL-CCNC: 26 U/L (ref 12–78)
ANION GAP SERPL CALC-SCNC: 5 MMOL/L (ref 2–12)
AST SERPL-CCNC: 34 U/L (ref 15–37)
BASOPHILS # BLD: 0 K/UL (ref 0–0.1)
BASOPHILS NFR BLD: 1 % (ref 0–1)
BILIRUB SERPL-MCNC: 0.6 MG/DL (ref 0.2–1)
BUN SERPL-MCNC: 22 MG/DL (ref 6–20)
BUN/CREAT SERPL: 28 (ref 12–20)
CALCIUM SERPL-MCNC: 10.5 MG/DL (ref 8.5–10.1)
CHLORIDE SERPL-SCNC: 105 MMOL/L (ref 97–108)
CO2 SERPL-SCNC: 29 MMOL/L (ref 21–32)
CREAT SERPL-MCNC: 0.8 MG/DL (ref 0.55–1.02)
DIFFERENTIAL METHOD BLD: ABNORMAL
EOSINOPHIL # BLD: 0.1 K/UL (ref 0–0.4)
EOSINOPHIL NFR BLD: 1 % (ref 0–7)
ERYTHROCYTE [DISTWIDTH] IN BLOOD BY AUTOMATED COUNT: 12.5 % (ref 11.5–14.5)
GLOBULIN SER CALC-MCNC: 3.2 G/DL (ref 2–4)
GLUCOSE SERPL-MCNC: 104 MG/DL (ref 65–100)
HCT VFR BLD AUTO: 37.8 % (ref 35–47)
HGB BLD-MCNC: 11.9 G/DL (ref 11.5–16)
IMM GRANULOCYTES # BLD AUTO: 0 K/UL (ref 0–0.04)
IMM GRANULOCYTES NFR BLD AUTO: 0 % (ref 0–0.5)
LYMPHOCYTES # BLD: 1.6 K/UL (ref 0.8–3.5)
LYMPHOCYTES NFR BLD: 30 % (ref 12–49)
MCH RBC QN AUTO: 31.3 PG (ref 26–34)
MCHC RBC AUTO-ENTMCNC: 31.5 G/DL (ref 30–36.5)
MCV RBC AUTO: 99.5 FL (ref 80–99)
MONOCYTES # BLD: 0.5 K/UL (ref 0–1)
MONOCYTES NFR BLD: 9 % (ref 5–13)
NEUTS SEG # BLD: 3.1 K/UL (ref 1.8–8)
NEUTS SEG NFR BLD: 59 % (ref 32–75)
NRBC # BLD: 0 K/UL (ref 0–0.01)
NRBC BLD-RTO: 0 PER 100 WBC
PLATELET # BLD AUTO: 214 K/UL (ref 150–400)
PMV BLD AUTO: 11 FL (ref 8.9–12.9)
POTASSIUM SERPL-SCNC: 5.8 MMOL/L (ref 3.5–5.1)
PROT SERPL-MCNC: 7.6 G/DL (ref 6.4–8.2)
RBC # BLD AUTO: 3.8 M/UL (ref 3.8–5.2)
SODIUM SERPL-SCNC: 139 MMOL/L (ref 136–145)
WBC # BLD AUTO: 5.2 K/UL (ref 3.6–11)

## 2024-10-30 ENCOUNTER — LAB (OUTPATIENT)
Age: 84
End: 2024-10-30

## 2024-10-30 DIAGNOSIS — E87.5 HIGH POTASSIUM: ICD-10-CM

## 2024-10-30 DIAGNOSIS — R79.89 HIGH SERUM CALCIUM: ICD-10-CM

## 2024-10-30 LAB
ANION GAP SERPL CALC-SCNC: 4 MMOL/L (ref 2–12)
BUN SERPL-MCNC: 20 MG/DL (ref 6–20)
BUN/CREAT SERPL: 25 (ref 12–20)
CALCIUM SERPL-MCNC: 10.5 MG/DL (ref 8.5–10.1)
CALCIUM SERPL-MCNC: 10.7 MG/DL (ref 8.5–10.1)
CHLORIDE SERPL-SCNC: 105 MMOL/L (ref 97–108)
CO2 SERPL-SCNC: 28 MMOL/L (ref 21–32)
CREAT SERPL-MCNC: 0.81 MG/DL (ref 0.55–1.02)
GLUCOSE SERPL-MCNC: 98 MG/DL (ref 65–100)
POTASSIUM SERPL-SCNC: 5.3 MMOL/L (ref 3.5–5.1)
PTH-INTACT SERPL-MCNC: 56 PG/ML (ref 18.4–88)
SODIUM SERPL-SCNC: 137 MMOL/L (ref 136–145)

## 2024-11-04 ENCOUNTER — PATIENT MESSAGE (OUTPATIENT)
Age: 84
End: 2024-11-04

## 2024-11-04 DIAGNOSIS — E83.52 HIGH CALCIUM LEVELS: Primary | ICD-10-CM

## 2024-11-04 SDOH — HEALTH STABILITY: PHYSICAL HEALTH: ON AVERAGE, HOW MANY MINUTES DO YOU ENGAGE IN EXERCISE AT THIS LEVEL?: 120 MIN

## 2024-11-04 SDOH — HEALTH STABILITY: PHYSICAL HEALTH: ON AVERAGE, HOW MANY DAYS PER WEEK DO YOU ENGAGE IN MODERATE TO STRENUOUS EXERCISE (LIKE A BRISK WALK)?: 7 DAYS

## 2024-11-04 ASSESSMENT — PATIENT HEALTH QUESTIONNAIRE - PHQ9
1. LITTLE INTEREST OR PLEASURE IN DOING THINGS: NOT AT ALL
SUM OF ALL RESPONSES TO PHQ QUESTIONS 1-9: 0
2. FEELING DOWN, DEPRESSED OR HOPELESS: NOT AT ALL
SUM OF ALL RESPONSES TO PHQ9 QUESTIONS 1 & 2: 0
SUM OF ALL RESPONSES TO PHQ QUESTIONS 1-9: 0

## 2024-11-04 ASSESSMENT — LIFESTYLE VARIABLES
HOW MANY STANDARD DRINKS CONTAINING ALCOHOL DO YOU HAVE ON A TYPICAL DAY: 0
HOW MANY STANDARD DRINKS CONTAINING ALCOHOL DO YOU HAVE ON A TYPICAL DAY: PATIENT DOES NOT DRINK
HOW OFTEN DO YOU HAVE A DRINK CONTAINING ALCOHOL: NEVER
HOW OFTEN DO YOU HAVE A DRINK CONTAINING ALCOHOL: 1
HOW OFTEN DO YOU HAVE SIX OR MORE DRINKS ON ONE OCCASION: 1

## 2024-11-05 ENCOUNTER — OFFICE VISIT (OUTPATIENT)
Age: 84
End: 2024-11-05

## 2024-11-05 VITALS
TEMPERATURE: 97.8 F | HEIGHT: 64 IN | WEIGHT: 109.2 LBS | BODY MASS INDEX: 18.64 KG/M2 | SYSTOLIC BLOOD PRESSURE: 166 MMHG | HEART RATE: 75 BPM | RESPIRATION RATE: 20 BRPM | DIASTOLIC BLOOD PRESSURE: 72 MMHG | OXYGEN SATURATION: 100 %

## 2024-11-05 DIAGNOSIS — I10 WHITE COAT SYNDROME WITH DIAGNOSIS OF HYPERTENSION: ICD-10-CM

## 2024-11-05 DIAGNOSIS — E83.52 HIGH CALCIUM LEVELS: ICD-10-CM

## 2024-11-05 DIAGNOSIS — Z00.00 MEDICARE ANNUAL WELLNESS VISIT, SUBSEQUENT: Primary | ICD-10-CM

## 2024-11-05 DIAGNOSIS — E87.5 HIGH POTASSIUM: ICD-10-CM

## 2024-11-05 LAB — 25(OH)D3 SERPL-MCNC: 63.3 NG/ML (ref 30–100)

## 2024-11-05 NOTE — PATIENT INSTRUCTIONS
lets see what the vitamin D is and hold vitamin D   Repeat calcium check in 4 weeks and potassium check     Recommend prevnar 20 and RSV vaccine at your local pharmacy  A Healthy Heart: Care Instructions  Overview     Coronary artery disease, also called heart disease, occurs when a substance called plaque builds up in the vessels that supply oxygen-rich blood to your heart muscle. This can narrow the blood vessels and reduce blood flow. A heart attack happens when blood flow is completely blocked. A high-fat diet, smoking, and other factors increase the risk of heart disease.  Your doctor has found that you have a chance of having heart disease. A heart-healthy lifestyle can help keep your heart healthy and prevent heart disease. This lifestyle includes eating healthy, being active, staying at a weight that's healthy for you, and not smoking or using tobacco. It also includes taking medicines as directed, managing other health conditions, and trying to get a healthy amount of sleep.  Follow-up care is a key part of your treatment and safety. Be sure to make and go to all appointments, and call your doctor if you are having problems. It's also a good idea to know your test results and keep a list of the medicines you take.  How can you care for yourself at home?  Diet    Use less salt when you cook and eat. This helps lower your blood pressure. Taste food before salting. Add only a little salt when you think you need it. With time, your taste buds will adjust to less salt.     Eat fewer snack items, fast foods, canned soups, and other high-salt, high-fat, processed foods.     Read food labels and try to avoid saturated and trans fats. They increase your risk of heart disease by raising cholesterol levels.     Limit the amount of solid fat--butter, margarine, and shortening--you eat. Use olive, peanut, or canola oil when you cook. Bake, broil, and steam foods instead of frying them.     Eat a variety of fruit and

## 2024-11-05 NOTE — PROGRESS NOTES
Medicare Annual Wellness Visit    Nesha Lazo is here for Medicare AWV    Assessment & Plan   Medicare annual wellness visit, subsequent  White coat syndrome with diagnosis of hypertension  High calcium levels  -     Basic Metabolic Panel; Future  -     Vitamin D 25 Hydroxy  High potassium  -     Basic Metabolic Panel; Future    Assessment & Plan  1. Hypertension.  Her home blood pressure readings are within the normal range, indicating effective management of her hypertension. She is currently taking losartan 50 mg and prefers to continue this medication as it does not cause dizziness. If her potassium levels remain high, discontinuation of losartan will be considered. She is also taking aspirin and atorvastatin (Lipitor).  Repeat BMP in one month    2. Hypercalcemia.  She has been advised to reduce her intake of vitamin D3. A repeat vitamin D level will be checked today  BMP in one month if calcium remains high with need parathyroid scan    3. Hyperkalemia. 5.3 K   I suspect due to losartan, patient wants to stay on it due to side effects.   She has been advised to avoid high-potassium foods such as raisins and bananas. A follow-up check for potassium and calcium levels will be done in 1 month.    4. Health Maintenance.  She was offered the Prevnar 20 vaccine but opted to get it at her local pharmacy. She received a flu shot yesterday.  Recommended RSV shot as well today    Follow-up  BMP in 4 weeks  Follow up with MD in 3 months     Recommendations for Preventive Services Due: see orders and patient instructions/AVS.  Recommended screening schedule for the next 5-10 years is provided to the patient in written form: see Patient Instructions/AVS.     Return in 6 months (on 5/5/2025).     Subjective   History of Present Illness  The patient presents for evaluation of hypertension.    She reports consistently high blood pressure readings during her visits here but home reads look excellent. Previous visit BP

## 2024-11-13 ENCOUNTER — HOSPITAL ENCOUNTER (OUTPATIENT)
Facility: HOSPITAL | Age: 84
Discharge: HOME | End: 2024-11-16
Attending: INTERNAL MEDICINE
Payer: MEDICARE

## 2024-11-13 DIAGNOSIS — E83.52 HIGH CALCIUM LEVELS: ICD-10-CM

## 2024-11-13 PROCEDURE — A9500 TC99M SESTAMIBI: HCPCS | Performed by: INTERNAL MEDICINE

## 2024-11-13 PROCEDURE — 78070 PARATHYROID PLANAR IMAGING: CPT

## 2024-11-13 PROCEDURE — 3430000000 HC RX DIAGNOSTIC RADIOPHARMACEUTICAL: Performed by: INTERNAL MEDICINE

## 2024-11-13 RX ORDER — TETRAKIS(2-METHOXYISOBUTYLISOCYANIDE)COPPER(I) TETRAFLUOROBORATE 1 MG/ML
25.4 INJECTION, POWDER, LYOPHILIZED, FOR SOLUTION INTRAVENOUS
Status: COMPLETED | OUTPATIENT
Start: 2024-11-13 | End: 2024-11-13

## 2024-11-13 RX ADMIN — TETRAKIS(2-METHOXYISOBUTYLISOCYANIDE)COPPER(I) TETRAFLUOROBORATE 25.4 MILLICURIE: 1 INJECTION, POWDER, LYOPHILIZED, FOR SOLUTION INTRAVENOUS at 11:00

## 2024-11-20 DIAGNOSIS — E78.00 HIGH CHOLESTEROL: ICD-10-CM

## 2024-11-20 DIAGNOSIS — Z86.73 HISTORY OF ISCHEMIC VERTEBROBASILAR ARTERY CEREBELLAR STROKE: ICD-10-CM

## 2024-11-20 RX ORDER — ATORVASTATIN CALCIUM 40 MG/1
40 TABLET, FILM COATED ORAL DAILY
Qty: 90 TABLET | Refills: 1 | Status: SHIPPED | OUTPATIENT
Start: 2024-11-20

## 2025-01-02 DIAGNOSIS — I10 PRIMARY HYPERTENSION: ICD-10-CM

## 2025-01-03 RX ORDER — LOSARTAN POTASSIUM 50 MG/1
50 TABLET ORAL DAILY
Qty: 90 TABLET | Refills: 1 | Status: SHIPPED | OUTPATIENT
Start: 2025-01-03

## 2025-05-21 DIAGNOSIS — E78.00 HIGH CHOLESTEROL: ICD-10-CM

## 2025-05-21 DIAGNOSIS — I10 PRIMARY HYPERTENSION: ICD-10-CM

## 2025-05-21 DIAGNOSIS — Z86.73 HISTORY OF ISCHEMIC VERTEBROBASILAR ARTERY CEREBELLAR STROKE: ICD-10-CM

## 2025-05-22 DIAGNOSIS — Z86.73 HISTORY OF ISCHEMIC VERTEBROBASILAR ARTERY CEREBELLAR STROKE: ICD-10-CM

## 2025-05-22 DIAGNOSIS — E78.00 HIGH CHOLESTEROL: ICD-10-CM

## 2025-05-22 RX ORDER — ATORVASTATIN CALCIUM 40 MG/1
40 TABLET, FILM COATED ORAL DAILY
Qty: 90 TABLET | Refills: 1 | OUTPATIENT
Start: 2025-05-22

## 2025-05-22 RX ORDER — LOSARTAN POTASSIUM 50 MG/1
50 TABLET ORAL DAILY
Qty: 90 TABLET | Refills: 1 | Status: SHIPPED | OUTPATIENT
Start: 2025-05-22

## 2025-05-22 RX ORDER — ATORVASTATIN CALCIUM 40 MG/1
40 TABLET, FILM COATED ORAL DAILY
Qty: 90 TABLET | Refills: 1 | Status: SHIPPED | OUTPATIENT
Start: 2025-05-22